# Patient Record
Sex: MALE | Race: WHITE | NOT HISPANIC OR LATINO | URBAN - METROPOLITAN AREA
[De-identification: names, ages, dates, MRNs, and addresses within clinical notes are randomized per-mention and may not be internally consistent; named-entity substitution may affect disease eponyms.]

---

## 2017-01-21 ENCOUNTER — EMERGENCY (EMERGENCY)
Facility: HOSPITAL | Age: 57
LOS: 0 days | Discharge: NURSING, REHAB OR LONG-TERM CARE | End: 2017-01-21
Admitting: EMERGENCY MEDICINE
Payer: MEDICARE

## 2017-01-21 DIAGNOSIS — M54.5 LOW BACK PAIN: ICD-10-CM

## 2017-01-21 DIAGNOSIS — Z79.52 LONG TERM (CURRENT) USE OF SYSTEMIC STEROIDS: ICD-10-CM

## 2017-01-21 DIAGNOSIS — Z91.81 HISTORY OF FALLING: ICD-10-CM

## 2017-01-21 PROCEDURE — 72125 CT NECK SPINE W/O DYE: CPT | Mod: 26

## 2017-01-21 PROCEDURE — 72128 CT CHEST SPINE W/O DYE: CPT | Mod: 26

## 2017-01-21 PROCEDURE — 73501 X-RAY EXAM HIP UNI 1 VIEW: CPT | Mod: 26

## 2017-01-21 PROCEDURE — 72131 CT LUMBAR SPINE W/O DYE: CPT | Mod: 26

## 2017-01-21 PROCEDURE — 71010: CPT | Mod: 26

## 2017-01-21 PROCEDURE — 70450 CT HEAD/BRAIN W/O DYE: CPT | Mod: 26

## 2017-01-21 PROCEDURE — 99284 EMERGENCY DEPT VISIT MOD MDM: CPT | Mod: 25

## 2017-03-16 PROBLEM — Z00.00 ENCOUNTER FOR PREVENTIVE HEALTH EXAMINATION: Status: ACTIVE | Noted: 2017-03-16

## 2017-03-21 ENCOUNTER — INPATIENT (INPATIENT)
Facility: HOSPITAL | Age: 57
LOS: 13 days | Discharge: HOME | End: 2017-04-04
Attending: PSYCHIATRY & NEUROLOGY | Admitting: PSYCHIATRY & NEUROLOGY
Payer: MEDICARE

## 2017-03-21 ENCOUNTER — APPOINTMENT (OUTPATIENT)
Dept: PHYSICAL MEDICINE AND REHAB | Facility: CLINIC | Age: 57
End: 2017-03-21

## 2017-03-21 ENCOUNTER — EMERGENCY (EMERGENCY)
Facility: HOSPITAL | Age: 57
LOS: 1 days | Discharge: HOME | End: 2017-03-21
Admitting: EMERGENCY MEDICINE
Payer: MEDICARE

## 2017-03-21 VITALS
DIASTOLIC BLOOD PRESSURE: 86 MMHG | HEART RATE: 75 BPM | SYSTOLIC BLOOD PRESSURE: 133 MMHG | HEIGHT: 69 IN | RESPIRATION RATE: 16 BRPM | WEIGHT: 165 LBS | OXYGEN SATURATION: 97 % | BODY MASS INDEX: 24.44 KG/M2

## 2017-03-21 DIAGNOSIS — Z80.8 FAMILY HISTORY OF MALIGNANT NEOPLASM OF OTHER ORGANS OR SYSTEMS: ICD-10-CM

## 2017-03-21 DIAGNOSIS — R45.851 SUICIDAL IDEATIONS: ICD-10-CM

## 2017-03-21 DIAGNOSIS — F32.9 MAJOR DEPRESSIVE DISORDER, SINGLE EPISODE, UNSPECIFIED: ICD-10-CM

## 2017-03-21 DIAGNOSIS — Z87.891 PERSONAL HISTORY OF NICOTINE DEPENDENCE: ICD-10-CM

## 2017-03-21 DIAGNOSIS — Z79.899 OTHER LONG TERM (CURRENT) DRUG THERAPY: ICD-10-CM

## 2017-03-21 DIAGNOSIS — Z88.0 ALLERGY STATUS TO PENICILLIN: ICD-10-CM

## 2017-03-21 DIAGNOSIS — Z78.9 OTHER SPECIFIED HEALTH STATUS: ICD-10-CM

## 2017-03-21 DIAGNOSIS — Z87.39 PERSONAL HISTORY OF OTHER DISEASES OF THE MUSCULOSKELETAL SYSTEM AND CONNECTIVE TISSUE: ICD-10-CM

## 2017-03-21 DIAGNOSIS — Z86.69 PERSONAL HISTORY OF OTHER DISEASES OF THE NERVOUS SYSTEM AND SENSE ORGANS: ICD-10-CM

## 2017-03-21 PROCEDURE — 96372 THER/PROPH/DIAG INJ SC/IM: CPT | Mod: XU

## 2017-03-21 PROCEDURE — 93010 ELECTROCARDIOGRAM REPORT: CPT

## 2017-03-21 PROCEDURE — 85610 PROTHROMBIN TIME: CPT

## 2017-03-21 PROCEDURE — 85730 THROMBOPLASTIN TIME PARTIAL: CPT

## 2017-03-21 PROCEDURE — 84443 ASSAY THYROID STIM HORMONE: CPT

## 2017-03-21 PROCEDURE — 96360 HYDRATION IV INFUSION INIT: CPT

## 2017-03-21 PROCEDURE — 80053 COMPREHEN METABOLIC PANEL: CPT

## 2017-03-21 PROCEDURE — 81003 URINALYSIS AUTO W/O SCOPE: CPT

## 2017-03-21 PROCEDURE — 80307 DRUG TEST PRSMV CHEM ANLYZR: CPT

## 2017-03-21 PROCEDURE — 99285 EMERGENCY DEPT VISIT HI MDM: CPT

## 2017-03-21 PROCEDURE — 85027 COMPLETE CBC AUTOMATED: CPT

## 2017-03-21 PROCEDURE — 93005 ELECTROCARDIOGRAM TRACING: CPT

## 2017-03-21 PROCEDURE — 99285 EMERGENCY DEPT VISIT HI MDM: CPT | Mod: 25

## 2017-03-21 RX ORDER — GABAPENTIN 400 MG/1
300 CAPSULE ORAL DAILY
Qty: 0 | Refills: 0 | Status: DISCONTINUED | OUTPATIENT
Start: 2017-03-21 | End: 2017-04-04

## 2017-03-21 RX ORDER — HALOPERIDOL DECANOATE 100 MG/ML
5 INJECTION INTRAMUSCULAR EVERY 6 HOURS
Qty: 0 | Refills: 0 | Status: DISCONTINUED | OUTPATIENT
Start: 2017-03-21 | End: 2017-04-04

## 2017-03-21 RX ORDER — TAMSULOSIN HYDROCHLORIDE 0.4 MG/1
0.4 CAPSULE ORAL
Refills: 0 | Status: ACTIVE | COMMUNITY

## 2017-03-21 RX ORDER — LURASIDONE HYDROCHLORIDE 40 MG/1
20 TABLET ORAL
Qty: 0 | Refills: 0 | Status: DISCONTINUED | OUTPATIENT
Start: 2017-03-21 | End: 2017-03-23

## 2017-03-21 RX ORDER — TRAMADOL HYDROCHLORIDE 50 MG/1
50 TABLET ORAL DAILY
Qty: 0 | Refills: 0 | Status: DISCONTINUED | OUTPATIENT
Start: 2017-03-21 | End: 2017-03-28

## 2017-03-21 RX ORDER — TAMSULOSIN HYDROCHLORIDE 0.4 MG/1
0.4 CAPSULE ORAL AT BEDTIME
Qty: 0 | Refills: 0 | Status: DISCONTINUED | OUTPATIENT
Start: 2017-03-21 | End: 2017-04-04

## 2017-03-21 RX ORDER — BETHANECHOL CHLORIDE 25 MG/1
25 TABLET ORAL
Refills: 0 | Status: ACTIVE | COMMUNITY

## 2017-03-21 RX ORDER — FOLIC ACID 0.8 MG
1 TABLET ORAL DAILY
Qty: 0 | Refills: 0 | Status: DISCONTINUED | OUTPATIENT
Start: 2017-03-21 | End: 2017-04-04

## 2017-03-21 RX ORDER — THIAMINE MONONITRATE (VIT B1) 100 MG
100 TABLET ORAL DAILY
Qty: 0 | Refills: 0 | Status: DISCONTINUED | OUTPATIENT
Start: 2017-03-21 | End: 2017-04-04

## 2017-03-21 RX ORDER — BETHANECHOL CHLORIDE 25 MG
25 TABLET ORAL DAILY
Qty: 0 | Refills: 0 | Status: DISCONTINUED | OUTPATIENT
Start: 2017-03-21 | End: 2017-04-04

## 2017-03-21 RX ORDER — HYDROXYZINE HCL 10 MG
50 TABLET ORAL EVERY 6 HOURS
Qty: 0 | Refills: 0 | Status: DISCONTINUED | OUTPATIENT
Start: 2017-03-21 | End: 2017-04-04

## 2017-03-21 RX ORDER — GABAPENTIN 300 MG/1
300 CAPSULE ORAL
Refills: 0 | Status: ACTIVE | COMMUNITY

## 2017-03-21 RX ORDER — TRAMADOL HYDROCHLORIDE 50 MG/1
50 TABLET, COATED ORAL
Refills: 0 | Status: COMPLETED | COMMUNITY

## 2017-03-21 RX ADMIN — BACLOFEN 0.5 MG: 10 TABLET ORAL at 00:00

## 2017-03-22 VITALS — TEMPERATURE: 98 F

## 2017-03-22 LAB
ALBUMIN SERPL ELPH-MCNC: 4 G/DL — SIGNIFICANT CHANGE UP (ref 3.3–5)
ALP SERPL-CCNC: 48 U/L — SIGNIFICANT CHANGE UP (ref 40–120)
ALT FLD-CCNC: 9 U/L — SIGNIFICANT CHANGE UP (ref 4–41)
AST SERPL-CCNC: 23 U/L — SIGNIFICANT CHANGE UP (ref 4–40)
BASOPHILS # BLD AUTO: 0.02 K/UL — SIGNIFICANT CHANGE UP (ref 0–0.2)
BASOPHILS NFR BLD AUTO: 0.5 % — SIGNIFICANT CHANGE UP (ref 0–2)
BILIRUB SERPL-MCNC: 0.4 MG/DL — SIGNIFICANT CHANGE UP (ref 0.2–1.2)
BUN SERPL-MCNC: 12 MG/DL — SIGNIFICANT CHANGE UP (ref 7–23)
CALCIUM SERPL-MCNC: 9.7 MG/DL — SIGNIFICANT CHANGE UP (ref 8.4–10.5)
CHLORIDE SERPL-SCNC: 106 MMOL/L — SIGNIFICANT CHANGE UP (ref 98–107)
CHOLEST SERPL-MCNC: 134 MG/DL — SIGNIFICANT CHANGE UP (ref 120–199)
CO2 SERPL-SCNC: 21 MMOL/L — LOW (ref 22–31)
CREAT SERPL-MCNC: 0.86 MG/DL — SIGNIFICANT CHANGE UP (ref 0.5–1.3)
EOSINOPHIL # BLD AUTO: 0.29 K/UL — SIGNIFICANT CHANGE UP (ref 0–0.5)
EOSINOPHIL NFR BLD AUTO: 7.1 % — HIGH (ref 0–6)
GLUCOSE SERPL-MCNC: 74 MG/DL — SIGNIFICANT CHANGE UP (ref 70–99)
HCT VFR BLD CALC: 41.3 % — SIGNIFICANT CHANGE UP (ref 39–50)
HDLC SERPL-MCNC: 51 MG/DL — SIGNIFICANT CHANGE UP (ref 35–55)
HGB BLD-MCNC: 13.5 G/DL — SIGNIFICANT CHANGE UP (ref 13–17)
IMM GRANULOCYTES NFR BLD AUTO: 0.2 % — SIGNIFICANT CHANGE UP (ref 0–1.5)
LIPID PNL WITH DIRECT LDL SERPL: 75 MG/DL — SIGNIFICANT CHANGE UP
LYMPHOCYTES # BLD AUTO: 1.49 K/UL — SIGNIFICANT CHANGE UP (ref 1–3.3)
LYMPHOCYTES # BLD AUTO: 36.6 % — SIGNIFICANT CHANGE UP (ref 13–44)
MCHC RBC-ENTMCNC: 29 PG — SIGNIFICANT CHANGE UP (ref 27–34)
MCHC RBC-ENTMCNC: 32.7 % — SIGNIFICANT CHANGE UP (ref 32–36)
MCV RBC AUTO: 88.8 FL — SIGNIFICANT CHANGE UP (ref 80–100)
MONOCYTES # BLD AUTO: 0.35 K/UL — SIGNIFICANT CHANGE UP (ref 0–0.9)
MONOCYTES NFR BLD AUTO: 8.6 % — SIGNIFICANT CHANGE UP (ref 2–14)
NEUTROPHILS # BLD AUTO: 1.91 K/UL — SIGNIFICANT CHANGE UP (ref 1.8–7.4)
NEUTROPHILS NFR BLD AUTO: 47 % — SIGNIFICANT CHANGE UP (ref 43–77)
PLATELET # BLD AUTO: 291 K/UL — SIGNIFICANT CHANGE UP (ref 150–400)
PMV BLD: 10.9 FL — SIGNIFICANT CHANGE UP (ref 7–13)
POTASSIUM SERPL-MCNC: 4.4 MMOL/L — SIGNIFICANT CHANGE UP (ref 3.5–5.3)
POTASSIUM SERPL-SCNC: 4.4 MMOL/L — SIGNIFICANT CHANGE UP (ref 3.5–5.3)
PROT SERPL-MCNC: 6.4 G/DL — SIGNIFICANT CHANGE UP (ref 6–8.3)
RBC # BLD: 4.65 M/UL — SIGNIFICANT CHANGE UP (ref 4.2–5.8)
RBC # FLD: 14.3 % — SIGNIFICANT CHANGE UP (ref 10.3–14.5)
SODIUM SERPL-SCNC: 146 MMOL/L — HIGH (ref 135–145)
TRIGL SERPL-MCNC: 74 MG/DL — SIGNIFICANT CHANGE UP (ref 10–149)
TSH SERPL-MCNC: 2.16 UIU/ML — SIGNIFICANT CHANGE UP (ref 0.27–4.2)
WBC # BLD: 4.07 K/UL — SIGNIFICANT CHANGE UP (ref 3.8–10.5)
WBC # FLD AUTO: 4.07 K/UL — SIGNIFICANT CHANGE UP (ref 3.8–10.5)

## 2017-03-22 PROCEDURE — 93010 ELECTROCARDIOGRAM REPORT: CPT

## 2017-03-22 PROCEDURE — 99233 SBSQ HOSP IP/OBS HIGH 50: CPT

## 2017-03-22 RX ADMIN — Medication 100 MILLIGRAM(S): at 10:15

## 2017-03-22 RX ADMIN — Medication 1 MILLIGRAM(S): at 10:15

## 2017-03-22 RX ADMIN — TRAMADOL HYDROCHLORIDE 50 MILLIGRAM(S): 50 TABLET ORAL at 10:15

## 2017-03-22 RX ADMIN — GABAPENTIN 300 MILLIGRAM(S): 400 CAPSULE ORAL at 10:15

## 2017-03-22 RX ADMIN — TRAMADOL HYDROCHLORIDE 50 MILLIGRAM(S): 50 TABLET ORAL at 11:15

## 2017-03-22 RX ADMIN — Medication 25 MILLIGRAM(S): at 10:15

## 2017-03-22 RX ADMIN — LURASIDONE HYDROCHLORIDE 20 MILLIGRAM(S): 40 TABLET ORAL at 17:12

## 2017-03-22 RX ADMIN — TAMSULOSIN HYDROCHLORIDE 0.4 MILLIGRAM(S): 0.4 CAPSULE ORAL at 21:26

## 2017-03-22 RX ADMIN — Medication 1 TABLET(S): at 10:15

## 2017-03-23 LAB — HBA1C BLD-MCNC: 5.3 % — SIGNIFICANT CHANGE UP (ref 4–5.6)

## 2017-03-23 PROCEDURE — 99232 SBSQ HOSP IP/OBS MODERATE 35: CPT

## 2017-03-23 RX ORDER — BACLOFEN 10 MG/1
10 TABLET ORAL 3 TIMES DAILY
Qty: 30 | Refills: 0 | Status: ACTIVE | COMMUNITY
Start: 2017-03-23 | End: 1900-01-01

## 2017-03-23 RX ORDER — LURASIDONE HYDROCHLORIDE 40 MG/1
40 TABLET ORAL DAILY
Qty: 0 | Refills: 0 | Status: DISCONTINUED | OUTPATIENT
Start: 2017-03-23 | End: 2017-03-24

## 2017-03-23 RX ADMIN — Medication 1 MILLIGRAM(S): at 09:13

## 2017-03-23 RX ADMIN — Medication 100 MILLIGRAM(S): at 09:13

## 2017-03-23 RX ADMIN — TRAMADOL HYDROCHLORIDE 50 MILLIGRAM(S): 50 TABLET ORAL at 09:13

## 2017-03-23 RX ADMIN — TAMSULOSIN HYDROCHLORIDE 0.4 MILLIGRAM(S): 0.4 CAPSULE ORAL at 21:12

## 2017-03-23 RX ADMIN — GABAPENTIN 300 MILLIGRAM(S): 400 CAPSULE ORAL at 09:13

## 2017-03-23 RX ADMIN — Medication 25 MILLIGRAM(S): at 09:13

## 2017-03-23 RX ADMIN — Medication 1 TABLET(S): at 09:13

## 2017-03-24 PROCEDURE — 99232 SBSQ HOSP IP/OBS MODERATE 35: CPT

## 2017-03-24 RX ORDER — LURASIDONE HYDROCHLORIDE 40 MG/1
60 TABLET ORAL DAILY
Qty: 0 | Refills: 0 | Status: DISCONTINUED | OUTPATIENT
Start: 2017-03-24 | End: 2017-04-04

## 2017-03-24 RX ADMIN — Medication 100 MILLIGRAM(S): at 09:13

## 2017-03-24 RX ADMIN — Medication 1 TABLET(S): at 09:13

## 2017-03-24 RX ADMIN — Medication 1 MILLIGRAM(S): at 09:13

## 2017-03-24 RX ADMIN — Medication 25 MILLIGRAM(S): at 09:13

## 2017-03-24 RX ADMIN — GABAPENTIN 300 MILLIGRAM(S): 400 CAPSULE ORAL at 09:13

## 2017-03-24 RX ADMIN — TAMSULOSIN HYDROCHLORIDE 0.4 MILLIGRAM(S): 0.4 CAPSULE ORAL at 21:23

## 2017-03-24 RX ADMIN — TRAMADOL HYDROCHLORIDE 50 MILLIGRAM(S): 50 TABLET ORAL at 09:13

## 2017-03-24 RX ADMIN — TRAMADOL HYDROCHLORIDE 50 MILLIGRAM(S): 50 TABLET ORAL at 10:15

## 2017-03-24 RX ADMIN — LURASIDONE HYDROCHLORIDE 40 MILLIGRAM(S): 40 TABLET ORAL at 09:13

## 2017-03-25 PROCEDURE — 99231 SBSQ HOSP IP/OBS SF/LOW 25: CPT

## 2017-03-25 RX ADMIN — GABAPENTIN 300 MILLIGRAM(S): 400 CAPSULE ORAL at 09:22

## 2017-03-25 RX ADMIN — Medication 1 MILLIGRAM(S): at 09:22

## 2017-03-25 RX ADMIN — TRAMADOL HYDROCHLORIDE 50 MILLIGRAM(S): 50 TABLET ORAL at 09:24

## 2017-03-25 RX ADMIN — LURASIDONE HYDROCHLORIDE 60 MILLIGRAM(S): 40 TABLET ORAL at 09:23

## 2017-03-25 RX ADMIN — TRAMADOL HYDROCHLORIDE 50 MILLIGRAM(S): 50 TABLET ORAL at 10:15

## 2017-03-25 RX ADMIN — Medication 25 MILLIGRAM(S): at 09:22

## 2017-03-25 RX ADMIN — TAMSULOSIN HYDROCHLORIDE 0.4 MILLIGRAM(S): 0.4 CAPSULE ORAL at 20:31

## 2017-03-25 RX ADMIN — Medication 100 MILLIGRAM(S): at 09:23

## 2017-03-25 RX ADMIN — Medication 1 TABLET(S): at 09:23

## 2017-03-26 RX ADMIN — TRAMADOL HYDROCHLORIDE 50 MILLIGRAM(S): 50 TABLET ORAL at 09:23

## 2017-03-26 RX ADMIN — Medication 1 TABLET(S): at 09:23

## 2017-03-26 RX ADMIN — LURASIDONE HYDROCHLORIDE 60 MILLIGRAM(S): 40 TABLET ORAL at 09:23

## 2017-03-26 RX ADMIN — TAMSULOSIN HYDROCHLORIDE 0.4 MILLIGRAM(S): 0.4 CAPSULE ORAL at 21:54

## 2017-03-26 RX ADMIN — Medication 100 MILLIGRAM(S): at 09:23

## 2017-03-26 RX ADMIN — GABAPENTIN 300 MILLIGRAM(S): 400 CAPSULE ORAL at 09:23

## 2017-03-26 RX ADMIN — Medication 1 MILLIGRAM(S): at 09:23

## 2017-03-26 RX ADMIN — Medication 25 MILLIGRAM(S): at 09:22

## 2017-03-27 PROCEDURE — 99231 SBSQ HOSP IP/OBS SF/LOW 25: CPT

## 2017-03-27 RX ADMIN — Medication 25 MILLIGRAM(S): at 09:01

## 2017-03-27 RX ADMIN — TRAMADOL HYDROCHLORIDE 50 MILLIGRAM(S): 50 TABLET ORAL at 09:01

## 2017-03-27 RX ADMIN — Medication 100 MILLIGRAM(S): at 09:01

## 2017-03-27 RX ADMIN — TAMSULOSIN HYDROCHLORIDE 0.4 MILLIGRAM(S): 0.4 CAPSULE ORAL at 21:08

## 2017-03-27 RX ADMIN — Medication 1 MILLIGRAM(S): at 09:01

## 2017-03-27 RX ADMIN — GABAPENTIN 300 MILLIGRAM(S): 400 CAPSULE ORAL at 09:01

## 2017-03-27 RX ADMIN — Medication 1 TABLET(S): at 09:01

## 2017-03-27 RX ADMIN — LURASIDONE HYDROCHLORIDE 60 MILLIGRAM(S): 40 TABLET ORAL at 09:01

## 2017-03-28 PROCEDURE — 99232 SBSQ HOSP IP/OBS MODERATE 35: CPT

## 2017-03-28 RX ADMIN — TAMSULOSIN HYDROCHLORIDE 0.4 MILLIGRAM(S): 0.4 CAPSULE ORAL at 20:58

## 2017-03-28 RX ADMIN — Medication 1 TABLET(S): at 08:54

## 2017-03-28 RX ADMIN — Medication 25 MILLIGRAM(S): at 08:54

## 2017-03-28 RX ADMIN — TRAMADOL HYDROCHLORIDE 50 MILLIGRAM(S): 50 TABLET ORAL at 08:54

## 2017-03-28 RX ADMIN — LURASIDONE HYDROCHLORIDE 60 MILLIGRAM(S): 40 TABLET ORAL at 08:54

## 2017-03-28 RX ADMIN — GABAPENTIN 300 MILLIGRAM(S): 400 CAPSULE ORAL at 08:54

## 2017-03-28 RX ADMIN — Medication 100 MILLIGRAM(S): at 08:54

## 2017-03-28 RX ADMIN — Medication 1 MILLIGRAM(S): at 08:54

## 2017-03-28 RX ADMIN — TRAMADOL HYDROCHLORIDE 50 MILLIGRAM(S): 50 TABLET ORAL at 10:00

## 2017-03-29 PROCEDURE — 99232 SBSQ HOSP IP/OBS MODERATE 35: CPT

## 2017-03-29 RX ADMIN — Medication 100 MILLIGRAM(S): at 10:11

## 2017-03-29 RX ADMIN — Medication 1 TABLET(S): at 10:11

## 2017-03-29 RX ADMIN — Medication 1 MILLIGRAM(S): at 10:11

## 2017-03-29 RX ADMIN — Medication 25 MILLIGRAM(S): at 10:11

## 2017-03-29 RX ADMIN — LURASIDONE HYDROCHLORIDE 60 MILLIGRAM(S): 40 TABLET ORAL at 10:11

## 2017-03-29 RX ADMIN — TAMSULOSIN HYDROCHLORIDE 0.4 MILLIGRAM(S): 0.4 CAPSULE ORAL at 22:11

## 2017-03-29 RX ADMIN — GABAPENTIN 300 MILLIGRAM(S): 400 CAPSULE ORAL at 10:11

## 2017-03-30 PROCEDURE — 99232 SBSQ HOSP IP/OBS MODERATE 35: CPT

## 2017-03-30 RX ADMIN — GABAPENTIN 300 MILLIGRAM(S): 400 CAPSULE ORAL at 08:55

## 2017-03-30 RX ADMIN — Medication 100 MILLIGRAM(S): at 08:55

## 2017-03-30 RX ADMIN — Medication 1 MILLIGRAM(S): at 08:55

## 2017-03-30 RX ADMIN — Medication 1 TABLET(S): at 08:55

## 2017-03-30 RX ADMIN — LURASIDONE HYDROCHLORIDE 60 MILLIGRAM(S): 40 TABLET ORAL at 08:55

## 2017-03-30 RX ADMIN — Medication 25 MILLIGRAM(S): at 08:55

## 2017-03-30 RX ADMIN — TAMSULOSIN HYDROCHLORIDE 0.4 MILLIGRAM(S): 0.4 CAPSULE ORAL at 21:00

## 2017-03-31 PROCEDURE — 99232 SBSQ HOSP IP/OBS MODERATE 35: CPT

## 2017-03-31 RX ADMIN — LURASIDONE HYDROCHLORIDE 60 MILLIGRAM(S): 40 TABLET ORAL at 08:59

## 2017-03-31 RX ADMIN — Medication 1 MILLIGRAM(S): at 08:59

## 2017-03-31 RX ADMIN — GABAPENTIN 300 MILLIGRAM(S): 400 CAPSULE ORAL at 08:59

## 2017-03-31 RX ADMIN — Medication 100 MILLIGRAM(S): at 08:59

## 2017-03-31 RX ADMIN — Medication 1 TABLET(S): at 08:59

## 2017-03-31 RX ADMIN — Medication 25 MILLIGRAM(S): at 08:59

## 2017-03-31 RX ADMIN — TAMSULOSIN HYDROCHLORIDE 0.4 MILLIGRAM(S): 0.4 CAPSULE ORAL at 21:54

## 2017-04-01 RX ADMIN — LURASIDONE HYDROCHLORIDE 60 MILLIGRAM(S): 40 TABLET ORAL at 09:23

## 2017-04-01 RX ADMIN — Medication 25 MILLIGRAM(S): at 09:21

## 2017-04-01 RX ADMIN — GABAPENTIN 300 MILLIGRAM(S): 400 CAPSULE ORAL at 09:23

## 2017-04-01 RX ADMIN — Medication 100 MILLIGRAM(S): at 09:23

## 2017-04-01 RX ADMIN — Medication 1 TABLET(S): at 09:23

## 2017-04-01 RX ADMIN — TAMSULOSIN HYDROCHLORIDE 0.4 MILLIGRAM(S): 0.4 CAPSULE ORAL at 20:39

## 2017-04-01 RX ADMIN — Medication 1 MILLIGRAM(S): at 09:21

## 2017-04-02 RX ADMIN — LURASIDONE HYDROCHLORIDE 60 MILLIGRAM(S): 40 TABLET ORAL at 08:44

## 2017-04-02 RX ADMIN — Medication 1 TABLET(S): at 08:45

## 2017-04-02 RX ADMIN — Medication 25 MILLIGRAM(S): at 08:44

## 2017-04-02 RX ADMIN — Medication 100 MILLIGRAM(S): at 08:45

## 2017-04-02 RX ADMIN — Medication 1 MILLIGRAM(S): at 08:44

## 2017-04-02 RX ADMIN — GABAPENTIN 300 MILLIGRAM(S): 400 CAPSULE ORAL at 08:44

## 2017-04-02 RX ADMIN — TAMSULOSIN HYDROCHLORIDE 0.4 MILLIGRAM(S): 0.4 CAPSULE ORAL at 20:35

## 2017-04-03 PROCEDURE — 99232 SBSQ HOSP IP/OBS MODERATE 35: CPT

## 2017-04-03 RX ADMIN — Medication 1 TABLET(S): at 08:41

## 2017-04-03 RX ADMIN — TAMSULOSIN HYDROCHLORIDE 0.4 MILLIGRAM(S): 0.4 CAPSULE ORAL at 21:44

## 2017-04-03 RX ADMIN — Medication 1 MILLIGRAM(S): at 08:41

## 2017-04-03 RX ADMIN — Medication 25 MILLIGRAM(S): at 08:41

## 2017-04-03 RX ADMIN — LURASIDONE HYDROCHLORIDE 60 MILLIGRAM(S): 40 TABLET ORAL at 08:41

## 2017-04-03 RX ADMIN — Medication 100 MILLIGRAM(S): at 08:41

## 2017-04-03 RX ADMIN — GABAPENTIN 300 MILLIGRAM(S): 400 CAPSULE ORAL at 08:41

## 2017-04-04 VITALS — WEIGHT: 165.35 LBS | HEIGHT: 70 IN

## 2017-04-04 PROCEDURE — 99238 HOSP IP/OBS DSCHRG MGMT 30/<: CPT

## 2017-04-04 RX ORDER — LURASIDONE HYDROCHLORIDE 40 MG/1
1 TABLET ORAL
Qty: 15 | Refills: 0 | OUTPATIENT
Start: 2017-04-04 | End: 2017-04-19

## 2017-04-04 RX ORDER — THIAMINE MONONITRATE (VIT B1) 100 MG
1 TABLET ORAL
Qty: 15 | Refills: 0 | OUTPATIENT
Start: 2017-04-04 | End: 2017-04-19

## 2017-04-04 RX ORDER — TAMSULOSIN HYDROCHLORIDE 0.4 MG/1
1 CAPSULE ORAL
Qty: 15 | Refills: 0 | OUTPATIENT
Start: 2017-04-04 | End: 2017-04-19

## 2017-04-04 RX ORDER — GABAPENTIN 400 MG/1
1 CAPSULE ORAL
Qty: 15 | Refills: 0 | OUTPATIENT
Start: 2017-04-04 | End: 2017-04-19

## 2017-04-04 RX ORDER — FOLIC ACID 0.8 MG
1 TABLET ORAL
Qty: 15 | Refills: 0 | OUTPATIENT
Start: 2017-04-04 | End: 2017-04-19

## 2017-04-04 RX ORDER — BETHANECHOL CHLORIDE 25 MG
1 TABLET ORAL
Qty: 15 | Refills: 0 | OUTPATIENT
Start: 2017-04-04 | End: 2017-04-19

## 2017-04-04 RX ADMIN — Medication 1 MILLIGRAM(S): at 08:49

## 2017-04-04 RX ADMIN — LURASIDONE HYDROCHLORIDE 60 MILLIGRAM(S): 40 TABLET ORAL at 08:49

## 2017-04-04 RX ADMIN — Medication 100 MILLIGRAM(S): at 08:49

## 2017-04-04 RX ADMIN — GABAPENTIN 300 MILLIGRAM(S): 400 CAPSULE ORAL at 08:49

## 2017-04-04 RX ADMIN — Medication 25 MILLIGRAM(S): at 08:49

## 2017-04-04 RX ADMIN — Medication 1 TABLET(S): at 08:49

## 2017-04-27 ENCOUNTER — OUTPATIENT (OUTPATIENT)
Dept: OUTPATIENT SERVICES | Facility: HOSPITAL | Age: 57
LOS: 1 days | Discharge: STILL ADMITTED OR ADMINISTRATIVE | End: 2017-04-27

## 2017-04-27 DIAGNOSIS — F33.3 MAJOR DEPRESSIVE DISORDER, RECURRENT, SEVERE WITH PSYCHOTIC SYMPTOMS: ICD-10-CM

## 2017-05-04 ENCOUNTER — APPOINTMENT (OUTPATIENT)
Dept: PHYSICAL MEDICINE AND REHAB | Facility: CLINIC | Age: 57
End: 2017-05-04

## 2017-05-04 VITALS
SYSTOLIC BLOOD PRESSURE: 117 MMHG | BODY MASS INDEX: 24.44 KG/M2 | RESPIRATION RATE: 16 BRPM | WEIGHT: 165 LBS | HEIGHT: 69 IN | DIASTOLIC BLOOD PRESSURE: 81 MMHG | OXYGEN SATURATION: 98 % | HEART RATE: 89 BPM

## 2017-05-04 DIAGNOSIS — R25.2 CRAMP AND SPASM: ICD-10-CM

## 2017-05-04 DIAGNOSIS — G95.20 UNSPECIFIED CORD COMPRESSION: ICD-10-CM

## 2017-05-04 RX ORDER — TIZANIDINE 4 MG/1
4 TABLET ORAL TWICE DAILY
Qty: 30 | Refills: 0 | Status: ACTIVE | COMMUNITY
Start: 2017-05-04 | End: 1900-01-01

## 2017-07-24 ENCOUNTER — APPOINTMENT (OUTPATIENT)
Dept: NEUROLOGY | Facility: CLINIC | Age: 57
End: 2017-07-24

## 2017-09-28 ENCOUNTER — APPOINTMENT (OUTPATIENT)
Dept: NEUROLOGY | Facility: CLINIC | Age: 57
End: 2017-09-28

## 2018-01-13 ENCOUNTER — INPATIENT (INPATIENT)
Facility: HOSPITAL | Age: 58
LOS: 4 days | Discharge: HOME HEALTH | End: 2018-01-18
Attending: INTERNAL MEDICINE | Admitting: INTERNAL MEDICINE
Payer: MEDICARE

## 2018-01-13 VITALS
DIASTOLIC BLOOD PRESSURE: 103 MMHG | SYSTOLIC BLOOD PRESSURE: 146 MMHG | HEART RATE: 85 BPM | RESPIRATION RATE: 15 BRPM | TEMPERATURE: 98 F | HEIGHT: 67 IN | WEIGHT: 190.04 LBS | OXYGEN SATURATION: 100 %

## 2018-01-13 PROCEDURE — 99285 EMERGENCY DEPT VISIT HI MDM: CPT

## 2018-01-13 RX ORDER — IBUPROFEN 200 MG
600 TABLET ORAL ONCE
Qty: 0 | Refills: 0 | Status: COMPLETED | OUTPATIENT
Start: 2018-01-13 | End: 2018-01-13

## 2018-01-13 RX ORDER — ACETAMINOPHEN 500 MG
650 TABLET ORAL ONCE
Qty: 0 | Refills: 0 | Status: COMPLETED | OUTPATIENT
Start: 2018-01-13 | End: 2018-01-13

## 2018-01-13 RX ADMIN — Medication 600 MILLIGRAM(S): at 22:47

## 2018-01-13 RX ADMIN — Medication 650 MILLIGRAM(S): at 15:43
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
## 2018-01-13 NOTE — ED PROVIDER NOTE - CARE PLAN
Principal Discharge DX:	Back pain Principal Discharge DX:	Weakness  Secondary Diagnosis:	Failure to thrive in adult

## 2018-01-13 NOTE — ED PROVIDER NOTE - MEDICAL DECISION MAKING DETAILS
56 yo male with left sided back pain; no trauma or injury; no red flags; pain control; re-eval closely.

## 2018-01-13 NOTE — ED PROVIDER NOTE - OBJECTIVE STATEMENT
Patient is a 56 yo male with constant lower back pain x 10 days; patient reports hx of chronic neck pain; patient reports that sometimes- his neck pain makes his back pain worse; no incontinence of bowel or bladder function; no weakness; no numbness or tingling; patient uses cane and walker at home; no new trauma or injury; patient has been taking tramadol for pain with mild relief.

## 2018-01-14 LAB
ALBUMIN SERPL ELPH-MCNC: 3.7 G/DL — SIGNIFICANT CHANGE UP (ref 3.3–5)
ALP SERPL-CCNC: 61 U/L — SIGNIFICANT CHANGE UP (ref 40–120)
ALT FLD-CCNC: 15 U/L — SIGNIFICANT CHANGE UP (ref 12–78)
ANION GAP SERPL CALC-SCNC: 8 MMOL/L — SIGNIFICANT CHANGE UP (ref 5–17)
AST SERPL-CCNC: 35 U/L — SIGNIFICANT CHANGE UP (ref 15–37)
BASOPHILS # BLD AUTO: 0.1 K/UL — SIGNIFICANT CHANGE UP (ref 0–0.2)
BASOPHILS NFR BLD AUTO: 1.1 % — SIGNIFICANT CHANGE UP (ref 0–2)
BILIRUB SERPL-MCNC: 0.6 MG/DL — SIGNIFICANT CHANGE UP (ref 0.2–1.2)
BUN SERPL-MCNC: 12 MG/DL — SIGNIFICANT CHANGE UP (ref 7–23)
CALCIUM SERPL-MCNC: 9.2 MG/DL — SIGNIFICANT CHANGE UP (ref 8.5–10.1)
CHLORIDE SERPL-SCNC: 109 MMOL/L — HIGH (ref 96–108)
CO2 SERPL-SCNC: 25 MMOL/L — SIGNIFICANT CHANGE UP (ref 22–31)
CREAT SERPL-MCNC: 1.01 MG/DL — SIGNIFICANT CHANGE UP (ref 0.5–1.3)
EOSINOPHIL # BLD AUTO: 0.3 K/UL — SIGNIFICANT CHANGE UP (ref 0–0.5)
EOSINOPHIL NFR BLD AUTO: 5.2 % — SIGNIFICANT CHANGE UP (ref 0–6)
GLUCOSE SERPL-MCNC: 80 MG/DL — SIGNIFICANT CHANGE UP (ref 70–99)
HCT VFR BLD CALC: 45.3 % — SIGNIFICANT CHANGE UP (ref 39–50)
HGB BLD-MCNC: 15 G/DL — SIGNIFICANT CHANGE UP (ref 13–17)
LYMPHOCYTES # BLD AUTO: 1.2 K/UL — SIGNIFICANT CHANGE UP (ref 1–3.3)
LYMPHOCYTES # BLD AUTO: 25.3 % — SIGNIFICANT CHANGE UP (ref 13–44)
MCHC RBC-ENTMCNC: 29.3 PG — SIGNIFICANT CHANGE UP (ref 27–34)
MCHC RBC-ENTMCNC: 33.3 GM/DL — SIGNIFICANT CHANGE UP (ref 32–36)
MCV RBC AUTO: 88.2 FL — SIGNIFICANT CHANGE UP (ref 80–100)
MONOCYTES # BLD AUTO: 0.4 K/UL — SIGNIFICANT CHANGE UP (ref 0–0.9)
MONOCYTES NFR BLD AUTO: 8.9 % — SIGNIFICANT CHANGE UP (ref 2–14)
NEUTROPHILS # BLD AUTO: 2.9 K/UL — SIGNIFICANT CHANGE UP (ref 1.8–7.4)
NEUTROPHILS NFR BLD AUTO: 59.5 % — SIGNIFICANT CHANGE UP (ref 43–77)
PLATELET # BLD AUTO: 252 K/UL — SIGNIFICANT CHANGE UP (ref 150–400)
POTASSIUM SERPL-MCNC: 3.8 MMOL/L — SIGNIFICANT CHANGE UP (ref 3.5–5.3)
POTASSIUM SERPL-SCNC: 3.8 MMOL/L — SIGNIFICANT CHANGE UP (ref 3.5–5.3)
PROT SERPL-MCNC: 7 GM/DL — SIGNIFICANT CHANGE UP (ref 6–8.3)
RBC # BLD: 5.13 M/UL — SIGNIFICANT CHANGE UP (ref 4.2–5.8)
RBC # FLD: 12.8 % — SIGNIFICANT CHANGE UP (ref 10.3–14.5)
SODIUM SERPL-SCNC: 142 MMOL/L — SIGNIFICANT CHANGE UP (ref 135–145)
WBC # BLD: 4.9 K/UL — SIGNIFICANT CHANGE UP (ref 3.8–10.5)
WBC # FLD AUTO: 4.9 K/UL — SIGNIFICANT CHANGE UP (ref 3.8–10.5)

## 2018-01-14 PROCEDURE — 93010 ELECTROCARDIOGRAM REPORT: CPT

## 2018-01-14 PROCEDURE — 71045 X-RAY EXAM CHEST 1 VIEW: CPT | Mod: 26

## 2018-01-14 RX ORDER — GABAPENTIN 400 MG/1
300 CAPSULE ORAL DAILY
Qty: 0 | Refills: 0 | Status: DISCONTINUED | OUTPATIENT
Start: 2018-01-14 | End: 2018-01-18

## 2018-01-14 RX ORDER — DOCUSATE SODIUM 100 MG
100 CAPSULE ORAL THREE TIMES A DAY
Qty: 0 | Refills: 0 | Status: DISCONTINUED | OUTPATIENT
Start: 2018-01-14 | End: 2018-01-18

## 2018-01-14 RX ORDER — LURASIDONE HYDROCHLORIDE 40 MG/1
60 TABLET ORAL DAILY
Qty: 0 | Refills: 0 | Status: DISCONTINUED | OUTPATIENT
Start: 2018-01-14 | End: 2018-01-18

## 2018-01-14 RX ORDER — SODIUM CHLORIDE 9 MG/ML
1000 INJECTION INTRAMUSCULAR; INTRAVENOUS; SUBCUTANEOUS ONCE
Qty: 0 | Refills: 0 | Status: COMPLETED | OUTPATIENT
Start: 2018-01-14 | End: 2018-01-14

## 2018-01-14 RX ORDER — FOLIC ACID 0.8 MG
1 TABLET ORAL DAILY
Qty: 0 | Refills: 0 | Status: DISCONTINUED | OUTPATIENT
Start: 2018-01-14 | End: 2018-01-18

## 2018-01-14 RX ORDER — HEPARIN SODIUM 5000 [USP'U]/ML
5000 INJECTION INTRAVENOUS; SUBCUTANEOUS EVERY 8 HOURS
Qty: 0 | Refills: 0 | Status: DISCONTINUED | OUTPATIENT
Start: 2018-01-14 | End: 2018-01-18

## 2018-01-14 RX ORDER — INFLUENZA VIRUS VACCINE 15; 15; 15; 15 UG/.5ML; UG/.5ML; UG/.5ML; UG/.5ML
0.5 SUSPENSION INTRAMUSCULAR ONCE
Qty: 0 | Refills: 0 | Status: COMPLETED | OUTPATIENT
Start: 2018-01-14 | End: 2018-01-16

## 2018-01-14 RX ORDER — TAMSULOSIN HYDROCHLORIDE 0.4 MG/1
0.4 CAPSULE ORAL AT BEDTIME
Qty: 0 | Refills: 0 | Status: DISCONTINUED | OUTPATIENT
Start: 2018-01-14 | End: 2018-01-18

## 2018-01-14 RX ORDER — BETHANECHOL CHLORIDE 25 MG
25 TABLET ORAL DAILY
Qty: 0 | Refills: 0 | Status: DISCONTINUED | OUTPATIENT
Start: 2018-01-14 | End: 2018-01-18

## 2018-01-14 RX ORDER — MORPHINE SULFATE 50 MG/1
2 CAPSULE, EXTENDED RELEASE ORAL EVERY 4 HOURS
Qty: 0 | Refills: 0 | Status: DISCONTINUED | OUTPATIENT
Start: 2018-01-14 | End: 2018-01-15

## 2018-01-14 RX ORDER — ACETAMINOPHEN 500 MG
650 TABLET ORAL ONCE
Qty: 0 | Refills: 0 | Status: COMPLETED | OUTPATIENT
Start: 2018-01-14 | End: 2018-01-14

## 2018-01-14 RX ORDER — SENNA PLUS 8.6 MG/1
2 TABLET ORAL AT BEDTIME
Qty: 0 | Refills: 0 | Status: DISCONTINUED | OUTPATIENT
Start: 2018-01-14 | End: 2018-01-18

## 2018-01-14 RX ORDER — THIAMINE MONONITRATE (VIT B1) 100 MG
100 TABLET ORAL DAILY
Qty: 0 | Refills: 0 | Status: DISCONTINUED | OUTPATIENT
Start: 2018-01-14 | End: 2018-01-18

## 2018-01-14 RX ADMIN — Medication 650 MILLIGRAM(S): at 09:10

## 2018-01-14 RX ADMIN — MORPHINE SULFATE 2 MILLIGRAM(S): 50 CAPSULE, EXTENDED RELEASE ORAL at 15:05

## 2018-01-14 RX ADMIN — TAMSULOSIN HYDROCHLORIDE 0.4 MILLIGRAM(S): 0.4 CAPSULE ORAL at 21:34

## 2018-01-14 RX ADMIN — Medication 1 MILLIGRAM(S): at 16:45

## 2018-01-14 RX ADMIN — HEPARIN SODIUM 5000 UNIT(S): 5000 INJECTION INTRAVENOUS; SUBCUTANEOUS at 21:34

## 2018-01-14 RX ADMIN — LURASIDONE HYDROCHLORIDE 60 MILLIGRAM(S): 40 TABLET ORAL at 17:57

## 2018-01-14 RX ADMIN — SODIUM CHLORIDE 1000 MILLILITER(S): 9 INJECTION INTRAMUSCULAR; INTRAVENOUS; SUBCUTANEOUS at 13:30

## 2018-01-14 RX ADMIN — Medication 1 TABLET(S): at 16:45

## 2018-01-14 RX ADMIN — Medication 100 MILLIGRAM(S): at 17:57

## 2018-01-14 RX ADMIN — Medication 100 MILLIGRAM(S): at 21:34

## 2018-01-14 RX ADMIN — GABAPENTIN 300 MILLIGRAM(S): 400 CAPSULE ORAL at 16:45

## 2018-01-14 NOTE — H&P ADULT - NSHPPHYSICALEXAM_GEN_ALL_CORE
T(C): 36.9 (01-14-18 @ 00:25), Max: 36.9 (01-14-18 @ 00:25)  HR: 68 (01-14-18 @ 11:00) (68 - 80)  BP: 133/86 (01-14-18 @ 11:00) (124/80 - 135/78)  RR: 17 (01-14-18 @ 11:00) (16 - 18)  SpO2: 100% (01-14-18 @ 11:00) (99% - 100%)  Wt(kg): --    Gen: AAOx3, NAD  HEENT: NCAT, EOMI  Neck: Supple  CV: nml S1S2, RRR  Lungs: CTABL  Abd: Soft, NT, ND, BS+  Ext: No edema  Neuro: Non focal

## 2018-01-14 NOTE — H&P ADULT - ASSESSMENT
58yo male a/w back pain    # Back Pain  - no focal neurological deficits, chronic in nature, reports difficulty to ambulate with walker  - pain control  - Gabapentin  - PT eval  - SW consult, patient deemed unsafe to be discharged, needs placement    # DVT ppx, HSQ    # Admit, stable

## 2018-01-14 NOTE — H&P ADULT - NSHPLABSRESULTS_GEN_ALL_CORE
15.0   4.9   )-----------( 252      ( 14 Jan 2018 13:30 )             45.3     14 Jan 2018 13:30    142    |  109    |  12     ----------------------------<  80     3.8     |  25     |  1.01     Ca    9.2        14 Jan 2018 13:30    TPro  7.0    /  Alb  3.7    /  TBili  0.6    /  DBili  x      /  AST  35     /  ALT  15     /  AlkPhos  61     14 Jan 2018 13:30      CAPILLARY BLOOD GLUCOSE        LIVER FUNCTIONS - ( 14 Jan 2018 13:30 )  Alb: 3.7 g/dL / Pro: 7.0 gm/dL / ALK PHOS: 61 U/L / ALT: 15 U/L / AST: 35 U/L / GGT: x
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
## 2018-01-14 NOTE — H&P ADULT - NSHPREVIEWOFSYSTEMS_GEN_ALL_CORE
(-)Fever, chills, cough, chest pain, sob, headache, dizziness, palpitations, abd pain, n/v/d, leg swelling  (+)back pain

## 2018-01-15 LAB
APPEARANCE UR: CLEAR — SIGNIFICANT CHANGE UP
BILIRUB UR-MCNC: NEGATIVE — SIGNIFICANT CHANGE UP
COLOR SPEC: YELLOW — SIGNIFICANT CHANGE UP
DIFF PNL FLD: NEGATIVE — SIGNIFICANT CHANGE UP
GLUCOSE UR QL: NEGATIVE MG/DL — SIGNIFICANT CHANGE UP
KETONES UR-MCNC: (no result)
LEUKOCYTE ESTERASE UR-ACNC: NEGATIVE — SIGNIFICANT CHANGE UP
NITRITE UR-MCNC: NEGATIVE — SIGNIFICANT CHANGE UP
PH UR: 8 — SIGNIFICANT CHANGE UP (ref 5–8)
PROT UR-MCNC: NEGATIVE MG/DL — SIGNIFICANT CHANGE UP
SP GR SPEC: 1.01 — SIGNIFICANT CHANGE UP (ref 1.01–1.02)
UROBILINOGEN FLD QL: NEGATIVE MG/DL — SIGNIFICANT CHANGE UP

## 2018-01-15 PROCEDURE — 72131 CT LUMBAR SPINE W/O DYE: CPT | Mod: 26

## 2018-01-15 RX ORDER — HYDROMORPHONE HYDROCHLORIDE 2 MG/ML
1 INJECTION INTRAMUSCULAR; INTRAVENOUS; SUBCUTANEOUS
Qty: 0 | Refills: 0 | Status: DISCONTINUED | OUTPATIENT
Start: 2018-01-15 | End: 2018-01-18

## 2018-01-15 RX ORDER — OXYCODONE HYDROCHLORIDE 5 MG/1
5 TABLET ORAL EVERY 6 HOURS
Qty: 0 | Refills: 0 | Status: DISCONTINUED | OUTPATIENT
Start: 2018-01-15 | End: 2018-01-18

## 2018-01-15 RX ORDER — OXYCODONE HYDROCHLORIDE 5 MG/1
10 TABLET ORAL EVERY 6 HOURS
Qty: 0 | Refills: 0 | Status: DISCONTINUED | OUTPATIENT
Start: 2018-01-15 | End: 2018-01-18

## 2018-01-15 RX ADMIN — Medication 100 MILLIGRAM(S): at 13:08

## 2018-01-15 RX ADMIN — OXYCODONE HYDROCHLORIDE 10 MILLIGRAM(S): 5 TABLET ORAL at 23:16

## 2018-01-15 RX ADMIN — OXYCODONE HYDROCHLORIDE 10 MILLIGRAM(S): 5 TABLET ORAL at 10:17

## 2018-01-15 RX ADMIN — MORPHINE SULFATE 2 MILLIGRAM(S): 50 CAPSULE, EXTENDED RELEASE ORAL at 10:01

## 2018-01-15 RX ADMIN — Medication 1 TABLET(S): at 11:24

## 2018-01-15 RX ADMIN — Medication 100 MILLIGRAM(S): at 21:42

## 2018-01-15 RX ADMIN — LURASIDONE HYDROCHLORIDE 60 MILLIGRAM(S): 40 TABLET ORAL at 11:24

## 2018-01-15 RX ADMIN — TAMSULOSIN HYDROCHLORIDE 0.4 MILLIGRAM(S): 0.4 CAPSULE ORAL at 21:42

## 2018-01-15 RX ADMIN — Medication 100 MILLIGRAM(S): at 06:19

## 2018-01-15 RX ADMIN — Medication 25 MILLIGRAM(S): at 11:25

## 2018-01-15 RX ADMIN — HYDROMORPHONE HYDROCHLORIDE 1 MILLIGRAM(S): 2 INJECTION INTRAMUSCULAR; INTRAVENOUS; SUBCUTANEOUS at 18:15

## 2018-01-15 RX ADMIN — HEPARIN SODIUM 5000 UNIT(S): 5000 INJECTION INTRAVENOUS; SUBCUTANEOUS at 13:08

## 2018-01-15 RX ADMIN — MORPHINE SULFATE 2 MILLIGRAM(S): 50 CAPSULE, EXTENDED RELEASE ORAL at 09:01

## 2018-01-15 RX ADMIN — Medication 100 MILLIGRAM(S): at 11:25

## 2018-01-15 RX ADMIN — HYDROMORPHONE HYDROCHLORIDE 1 MILLIGRAM(S): 2 INJECTION INTRAMUSCULAR; INTRAVENOUS; SUBCUTANEOUS at 18:04

## 2018-01-15 RX ADMIN — HYDROMORPHONE HYDROCHLORIDE 1 MILLIGRAM(S): 2 INJECTION INTRAMUSCULAR; INTRAVENOUS; SUBCUTANEOUS at 21:44

## 2018-01-15 RX ADMIN — Medication 1 MILLIGRAM(S): at 11:25

## 2018-01-15 RX ADMIN — OXYCODONE HYDROCHLORIDE 10 MILLIGRAM(S): 5 TABLET ORAL at 11:07

## 2018-01-15 RX ADMIN — GABAPENTIN 300 MILLIGRAM(S): 400 CAPSULE ORAL at 11:24

## 2018-01-15 RX ADMIN — HEPARIN SODIUM 5000 UNIT(S): 5000 INJECTION INTRAVENOUS; SUBCUTANEOUS at 21:43

## 2018-01-15 RX ADMIN — HEPARIN SODIUM 5000 UNIT(S): 5000 INJECTION INTRAVENOUS; SUBCUTANEOUS at 06:19

## 2018-01-15 RX ADMIN — HYDROMORPHONE HYDROCHLORIDE 1 MILLIGRAM(S): 2 INJECTION INTRAMUSCULAR; INTRAVENOUS; SUBCUTANEOUS at 12:20

## 2018-01-15 RX ADMIN — HYDROMORPHONE HYDROCHLORIDE 1 MILLIGRAM(S): 2 INJECTION INTRAMUSCULAR; INTRAVENOUS; SUBCUTANEOUS at 22:00

## 2018-01-15 RX ADMIN — HYDROMORPHONE HYDROCHLORIDE 1 MILLIGRAM(S): 2 INJECTION INTRAMUSCULAR; INTRAVENOUS; SUBCUTANEOUS at 12:06

## 2018-01-15 NOTE — PHYSICAL THERAPY INITIAL EVALUATION ADULT - ACTIVE RANGE OF MOTION EXAMINATION, REHAB EVAL
Bilateral hip flexion ~ 90 degrees limited by pain. Right DF neutral. Noted left foot/ankle calcaneal valgus/pronation, ROM limited./no Active ROM deficits were identified
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
## 2018-01-16 DIAGNOSIS — M54.9 DORSALGIA, UNSPECIFIED: ICD-10-CM

## 2018-01-16 RX ADMIN — Medication 1 MILLIGRAM(S): at 11:16

## 2018-01-16 RX ADMIN — HYDROMORPHONE HYDROCHLORIDE 1 MILLIGRAM(S): 2 INJECTION INTRAMUSCULAR; INTRAVENOUS; SUBCUTANEOUS at 02:31

## 2018-01-16 RX ADMIN — HEPARIN SODIUM 5000 UNIT(S): 5000 INJECTION INTRAVENOUS; SUBCUTANEOUS at 21:46

## 2018-01-16 RX ADMIN — HYDROMORPHONE HYDROCHLORIDE 1 MILLIGRAM(S): 2 INJECTION INTRAMUSCULAR; INTRAVENOUS; SUBCUTANEOUS at 02:45

## 2018-01-16 RX ADMIN — LURASIDONE HYDROCHLORIDE 60 MILLIGRAM(S): 40 TABLET ORAL at 11:16

## 2018-01-16 RX ADMIN — TAMSULOSIN HYDROCHLORIDE 0.4 MILLIGRAM(S): 0.4 CAPSULE ORAL at 21:46

## 2018-01-16 RX ADMIN — Medication 100 MILLIGRAM(S): at 15:01

## 2018-01-16 RX ADMIN — Medication 1 TABLET(S): at 11:16

## 2018-01-16 RX ADMIN — Medication 60 MILLIGRAM(S): at 15:01

## 2018-01-16 RX ADMIN — HYDROMORPHONE HYDROCHLORIDE 1 MILLIGRAM(S): 2 INJECTION INTRAMUSCULAR; INTRAVENOUS; SUBCUTANEOUS at 06:21

## 2018-01-16 RX ADMIN — Medication 100 MILLIGRAM(S): at 05:42

## 2018-01-16 RX ADMIN — HEPARIN SODIUM 5000 UNIT(S): 5000 INJECTION INTRAVENOUS; SUBCUTANEOUS at 05:41

## 2018-01-16 RX ADMIN — Medication 100 MILLIGRAM(S): at 21:46

## 2018-01-16 RX ADMIN — Medication 25 MILLIGRAM(S): at 11:16

## 2018-01-16 RX ADMIN — HEPARIN SODIUM 5000 UNIT(S): 5000 INJECTION INTRAVENOUS; SUBCUTANEOUS at 15:01

## 2018-01-16 RX ADMIN — OXYCODONE HYDROCHLORIDE 10 MILLIGRAM(S): 5 TABLET ORAL at 00:00

## 2018-01-16 RX ADMIN — OXYCODONE HYDROCHLORIDE 10 MILLIGRAM(S): 5 TABLET ORAL at 16:41

## 2018-01-16 RX ADMIN — INFLUENZA VIRUS VACCINE 0.5 MILLILITER(S): 15; 15; 15; 15 SUSPENSION INTRAMUSCULAR at 05:42

## 2018-01-16 RX ADMIN — HYDROMORPHONE HYDROCHLORIDE 1 MILLIGRAM(S): 2 INJECTION INTRAMUSCULAR; INTRAVENOUS; SUBCUTANEOUS at 11:15

## 2018-01-16 RX ADMIN — GABAPENTIN 300 MILLIGRAM(S): 400 CAPSULE ORAL at 11:16

## 2018-01-16 RX ADMIN — HYDROMORPHONE HYDROCHLORIDE 1 MILLIGRAM(S): 2 INJECTION INTRAMUSCULAR; INTRAVENOUS; SUBCUTANEOUS at 05:54

## 2018-01-16 NOTE — CONSULT NOTE ADULT - PROBLEM SELECTOR RECOMMENDATION 9
Physical Therapy eval and treat. WBAT  Consider a Steroid Taper  Pain management.  Call back as needed.   Thank you for the consultation.  No surgical intervention indicated at this time. Physical Therapy eval and treat. WBAT  Consider a Steroid Taper  Pain management. May Consider SANIA if needed and does not respond to above.   Call back as needed.   Thank you for the consultation.  Discussed with Dr. Moseley  No surgical intervention indicated at this time.

## 2018-01-16 NOTE — CONSULT NOTE ADULT - ASSESSMENT
Patient with worsening Lumbar Degenerative disc disease and mild right radicular pain with acute flare up.   No signs of acute injury. Patient with worsening Lumbar Degenerative disc disease and foraminal Stenosis at L2/3 and L4/5 on the right side and mild right radicular pain with acute flare up.   No signs of acute injury.
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
## 2018-01-16 NOTE — CONSULT NOTE ADULT - SUBJECTIVE AND OBJECTIVE BOX
[facility]                                                            Orthopedic Spine Consultation    CHIEF COMPLAINT: Low back pain.     HPI: Patient states that he has low back pain which has worsened over the last 10 days after he had a severe spasm in his neck and low back. He has a old injury to his cervical spine which causes spasms which he states can be quite extensive and includes cramping that causes him to double over. He reports radiating into the RLE along the lateral aspect of the RLE but does not go below the knee. He has occasional numbness to the plantar surface of his feet bilaterally. Pain is worse when he is lying flat and found tolerating the CT scan difficult.   No incontinence of bladder or bowel.       PAST MEDICAL & SURGICAL HISTORY:  Hypertension  No significant past surgical history          REVIEW OF SYSTEMS:    CONSTITUTIONAL: No fever, weight loss, or fatigue  SPINE: See HPI  RESPIRATORY: No shortness of breath  CARDIOVASCULAR: No chest pain, palpitations.  GASTROINTESTINAL: No abdominal or epigastric pain. No nausea, vomiting, No diarrhea or constipation.  GENITOURINARY: No dysuria, frequency, hematuria, or incontinence  NEUROLOGICAL: See HPI  ENDOCRINE: No heat or cold intolerance  MUSCULOSKELETAL: See HPI      Vital Signs Last 24 Hrs  T(C): 36.3 (2018 11:12), Max: 36.9 (15 Isidoro 2018 23:20)  T(F): 97.3 (2018 11:12), Max: 98.4 (15 Isidoro 2018 23:20)  HR: 83 (2018 11:30) (83 - 95)  BP: 109/65 (2018 11:30) (104/65 - 144/76)  BP(mean): --  RR: 16 (2018 11:30) (16 - 18)  SpO2: 97% (2018 11:12) (97% - 99%)  I&O's Detail    15 Isidoro 2018 07:01  -  2018 07:00  --------------------------------------------------------  IN:  Total IN: 0 mL    OUT:    Voided: 475 mL  Total OUT: 475 mL    Total NET: -475 mL          LABS:  Urinalysis Basic - ( 2018 23:55 )    Color: Yellow / Appearance: Clear / S.010 / pH: x  Gluc: x / Ketone: Small  / Bili: Negative / Urobili: Negative mg/dL   Blood: x / Protein: Negative mg/dL / Nitrite: Negative   Leuk Esterase: Negative / RBC: x / WBC x   Sq Epi: x / Non Sq Epi: x / Bacteria: x        RADIOLOGY & ADDITIONAL STUDIES: Compared to study of 17   Levoscoliosis of the lumbar spine. Severe intervertebral   disc height loss at L2/L3, L4/L5 with endplate spondylytic changes.   Multilevel posterior disc osteophyte complexes and facet arthropathy   result in severe right neural foraminal stenosis at L2/L3 and L4/L5, and   severe left-sided foraminal stenosis at L4/L5 and L5/S1. Mild to moderate   central spinal canal stenosis from L2/L3-L5/S1.       PHYSICAL EXAM:  Constitutional: NAD  HEENT:  Normal Hearing  Extremities: Moving lower extremities well with good power  Vascular: 2+ peripheral pulses  Psychiatric: Normal mood, normal affect      Lumbar: ROM : Not tested but patient has difficulty lying supine.   Neurological: A/O x  3          Sensation: [x] intact to light touch  [ ] decreased:          Motor exam: Moves LE well and with good power.  No focal deficits evident at this time.           Long Tract Findings: None present [facility]                                                            Orthopedic Spine Consultation    CHIEF COMPLAINT: Low back pain.     HPI: Patient states that he has low back pain which has worsened over the last 10 days after he had a severe spasm in his neck and low back. He has a old injury to his cervical spine which causes spasms which he states can be quite extensive and includes cramping that causes him to double over. He reports radiating into the RLE along the lateral aspect of the RLE but does not go below the knee. He has occasional numbness to the plantar surface of his feet bilaterally. Pain is worse when he is lying flat and found tolerating the CT scan difficult.   No incontinence of bladder or bowel.       PAST MEDICAL & SURGICAL HISTORY:  Hypertension  No significant past surgical history          REVIEW OF SYSTEMS:    CONSTITUTIONAL: No fever,   SPINE: See HPI  RESPIRATORY: No shortness of breath  CARDIOVASCULAR: No chest pain, palpitations.  GASTROINTESTINAL: No abdominal or epigastric pain. No nausea, vomiting, No diarrhea or constipation.  GENITOURINARY: No dysuria, incontinence  NEUROLOGICAL: See HPI  ENDOCRINE: No heat or cold intolerance  MUSCULOSKELETAL: See HPI      Vital Signs Last 24 Hrs  T(C): 36.3 (2018 11:12), Max: 36.9 (15 Isidoro 2018 23:20)  T(F): 97.3 (2018 11:12), Max: 98.4 (15 Isidoro 2018 23:20)  HR: 83 (2018 11:30) (83 - 95)  BP: 109/65 (2018 11:30) (104/65 - 144/76)  BP(mean): --  RR: 16 (2018 11:30) (16 - 18)  SpO2: 97% (2018 11:12) (97% - 99%)  I&O's Detail    15 Isidoro 2018 07:01  -  2018 07:00  --------------------------------------------------------  IN:  Total IN: 0 mL    OUT:    Voided: 475 mL  Total OUT: 475 mL    Total NET: -475 mL          LABS:  Urinalysis Basic - ( 2018 23:55 )    Color: Yellow / Appearance: Clear / S.010 / pH: x  Gluc: x / Ketone: Small  / Bili: Negative / Urobili: Negative mg/dL   Blood: x / Protein: Negative mg/dL / Nitrite: Negative   Leuk Esterase: Negative / RBC: x / WBC x   Sq Epi: x / Non Sq Epi: x / Bacteria: x        RADIOLOGY & ADDITIONAL STUDIES: Compared to study of 17   Levoscoliosis of the lumbar spine. Severe intervertebral   disc height loss at L2/L3, L4/L5 with endplate spondylytic changes.   Multilevel posterior disc osteophyte complexes and facet arthropathy   result in severe right neural foraminal stenosis at L2/L3 and L4/L5, and   severe left-sided foraminal stenosis at L4/L5 and L5/S1. Mild to moderate   central spinal canal stenosis from L2/L3-L5/S1.       PHYSICAL EXAM:  Constitutional: NAD  HEENT:  Normal Hearing  Extremities: Moving lower extremities well with good power  Vascular: 2+ peripheral pulses  Psychiatric: Normal mood, normal affect    Lumbar: ROM : Not tested but patient has difficulty lying supine.   Neurological: A/O x  3          Sensation: [x] intact to light touch  [ ] decreased:          Motor exam: Moves LE well and with good power.  No focal deficits evident at this time.           Long Tract Findings: None present [facility]                                                            Orthopedic Spine Consultation    CHIEF COMPLAINT: Low back pain.     HPI: Patient states that he has low back pain which has worsened over the last 10 days after he had a severe spasm in his neck and low back. He has a old injury to his cervical spine which causes spasms which he states can be quite extensive and includes cramping that causes him to double over. He reports radiating into the RLE along the lateral aspect of the RLE but does not go below the knee. He has occasional numbness to the plantar surface of his feet bilaterally. Pain is worse when he is lying flat and found tolerating the CT scan difficult. Patient has never had epidurals.   No incontinence of bladder or bowel.       PAST MEDICAL & SURGICAL HISTORY:  Hypertension  No significant past surgical history          REVIEW OF SYSTEMS:    CONSTITUTIONAL: No fever,   SPINE: See HPI  RESPIRATORY: No shortness of breath  CARDIOVASCULAR: No chest pain, palpitations.  GASTROINTESTINAL: No abdominal or epigastric pain. No nausea, vomiting, No diarrhea or constipation.  GENITOURINARY: No dysuria, incontinence  NEUROLOGICAL: See HPI  ENDOCRINE: No heat or cold intolerance  MUSCULOSKELETAL: See HPI      Vital Signs Last 24 Hrs  T(C): 36.3 (2018 11:12), Max: 36.9 (15 Isidoro 2018 23:20)  T(F): 97.3 (2018 11:12), Max: 98.4 (15 Isidoro 2018 23:20)  HR: 83 (2018 11:30) (83 - 95)  BP: 109/65 (2018 11:30) (104/65 - 144/76)  BP(mean): --  RR: 16 (2018 11:30) (16 - 18)  SpO2: 97% (2018 11:12) (97% - 99%)  I&O's Detail    15 Isidoro 2018 07:01  -  2018 07:00  --------------------------------------------------------  IN:  Total IN: 0 mL    OUT:    Voided: 475 mL  Total OUT: 475 mL    Total NET: -475 mL          LABS:  Urinalysis Basic - ( 2018 23:55 )    Color: Yellow / Appearance: Clear / S.010 / pH: x  Gluc: x / Ketone: Small  / Bili: Negative / Urobili: Negative mg/dL   Blood: x / Protein: Negative mg/dL / Nitrite: Negative   Leuk Esterase: Negative / RBC: x / WBC x   Sq Epi: x / Non Sq Epi: x / Bacteria: x        RADIOLOGY & ADDITIONAL STUDIES: Compared to study of 17   Levoscoliosis of the lumbar spine. Severe intervertebral   disc height loss at L2/L3, L4/L5 with endplate spondylytic changes.   Multilevel posterior disc osteophyte complexes and facet arthropathy   result in severe right neural foraminal stenosis at L2/L3 and L4/L5, and   severe left-sided foraminal stenosis at L4/L5 and L5/S1. Mild to moderate   central spinal canal stenosis from L2/L3-L5/S1.       PHYSICAL EXAM:  Constitutional: NAD  HEENT:  Normal Hearing  Extremities: Moving lower extremities well with good power  Vascular: 2+ peripheral pulses  Psychiatric: Normal mood, normal affect    Lumbar: ROM : Not tested but patient has difficulty lying supine.   Neurological: A/O x  3          Sensation: [x] intact to light touch  [ ] decreased:          Motor exam: Moves LE well and with good power.  No focal deficits evident at this time.           Long Tract Findings: None present

## 2018-01-17 RX ADMIN — Medication 25 MILLIGRAM(S): at 11:17

## 2018-01-17 RX ADMIN — HEPARIN SODIUM 5000 UNIT(S): 5000 INJECTION INTRAVENOUS; SUBCUTANEOUS at 22:24

## 2018-01-17 RX ADMIN — Medication 60 MILLIGRAM(S): at 11:17

## 2018-01-17 RX ADMIN — LURASIDONE HYDROCHLORIDE 60 MILLIGRAM(S): 40 TABLET ORAL at 11:17

## 2018-01-17 RX ADMIN — Medication 100 MILLIGRAM(S): at 07:15

## 2018-01-17 RX ADMIN — Medication 1 MILLIGRAM(S): at 11:17

## 2018-01-17 RX ADMIN — TAMSULOSIN HYDROCHLORIDE 0.4 MILLIGRAM(S): 0.4 CAPSULE ORAL at 23:05

## 2018-01-17 RX ADMIN — HEPARIN SODIUM 5000 UNIT(S): 5000 INJECTION INTRAVENOUS; SUBCUTANEOUS at 13:14

## 2018-01-17 RX ADMIN — Medication 1 TABLET(S): at 11:17

## 2018-01-17 RX ADMIN — OXYCODONE HYDROCHLORIDE 10 MILLIGRAM(S): 5 TABLET ORAL at 14:09

## 2018-01-17 RX ADMIN — Medication 100 MILLIGRAM(S): at 22:25

## 2018-01-17 RX ADMIN — HEPARIN SODIUM 5000 UNIT(S): 5000 INJECTION INTRAVENOUS; SUBCUTANEOUS at 07:16

## 2018-01-17 RX ADMIN — GABAPENTIN 300 MILLIGRAM(S): 400 CAPSULE ORAL at 11:17

## 2018-01-17 RX ADMIN — Medication 100 MILLIGRAM(S): at 11:17

## 2018-01-17 RX ADMIN — Medication 100 MILLIGRAM(S): at 13:14
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
## 2018-01-18 ENCOUNTER — TRANSCRIPTION ENCOUNTER (OUTPATIENT)
Age: 58
End: 2018-01-18

## 2018-01-18 VITALS
DIASTOLIC BLOOD PRESSURE: 81 MMHG | HEART RATE: 88 BPM | RESPIRATION RATE: 16 BRPM | TEMPERATURE: 98 F | OXYGEN SATURATION: 95 % | SYSTOLIC BLOOD PRESSURE: 126 MMHG

## 2018-01-18 RX ORDER — OXYCODONE HYDROCHLORIDE 5 MG/1
1 TABLET ORAL
Qty: 20 | Refills: 0 | OUTPATIENT
Start: 2018-01-18 | End: 2018-01-22

## 2018-01-18 RX ORDER — DOCUSATE SODIUM 100 MG
1 CAPSULE ORAL
Qty: 0 | Refills: 0 | COMMUNITY
Start: 2018-01-18

## 2018-01-18 RX ORDER — SENNA PLUS 8.6 MG/1
2 TABLET ORAL
Qty: 0 | Refills: 0 | COMMUNITY
Start: 2018-01-18

## 2018-01-18 RX ADMIN — Medication 1 MILLIGRAM(S): at 12:30

## 2018-01-18 RX ADMIN — LURASIDONE HYDROCHLORIDE 60 MILLIGRAM(S): 40 TABLET ORAL at 12:30

## 2018-01-18 RX ADMIN — Medication 60 MILLIGRAM(S): at 06:15

## 2018-01-18 RX ADMIN — HEPARIN SODIUM 5000 UNIT(S): 5000 INJECTION INTRAVENOUS; SUBCUTANEOUS at 06:19

## 2018-01-18 RX ADMIN — Medication 100 MILLIGRAM(S): at 12:30

## 2018-01-18 RX ADMIN — Medication 1 TABLET(S): at 12:30

## 2018-01-18 RX ADMIN — Medication 100 MILLIGRAM(S): at 06:15

## 2018-01-18 RX ADMIN — GABAPENTIN 300 MILLIGRAM(S): 400 CAPSULE ORAL at 12:30

## 2018-01-18 NOTE — DISCHARGE NOTE ADULT - MEDICATION SUMMARY - MEDICATIONS TO TAKE
I will START or STAY ON the medications listed below when I get home from the hospital:    predniSONE 20 mg oral tablet  -- 2 tab(s) by mouth once a day for 2 days, then 1 tab oral once a day for 2 days, then 1/2 tab oral once a day for 2 days.   -- Indication: For For bakc pain    oxyCODONE 10 mg oral tablet  -- 1 tab(s) by mouth every 6 hours, As needed, Moderate Pain (4 - 6)for pain MDD:4 tabs  -- Indication: For For back pain    traMADol 50 mg oral tablet  -- 1 tab(s) by mouth every 4 hours, As Needed  -- Indication: For Home med for pain    tamsulosin 0.4 mg oral capsule  -- 1 cap(s) by mouth once a day (at bedtime)  -- Indication: For Home med    gabapentin 300 mg oral capsule  -- 1 cap(s) by mouth once a day  -- Indication: For Home med    lurasidone 60 mg oral tablet  -- 1 tab(s) by mouth once a day  -- Indication: For David emed    senna oral tablet  -- 2 tab(s) by mouth once a day (at bedtime)  -- Indication: For constipation    docusate sodium 100 mg oral capsule  -- 1 cap(s) by mouth 3 times a day  -- Indication: For constipation    bethanechol 25 mg oral tablet  -- 1 tab(s) by mouth once a day  -- Indication: For Home med    Multiple Vitamins oral tablet  -- 1 tab(s) by mouth once a day  -- Indication: For Home med    folic acid 1 mg oral tablet  -- 1 tab(s) by mouth once a day  -- Indication: For David emed    thiamine 100 mg oral tablet  -- 1 tab(s) by mouth once a day  -- Indication: For Home med

## 2018-01-18 NOTE — DISCHARGE NOTE ADULT - CARE PROVIDER_API CALL
Nallely Moseley), Orthopaedic Surgery  [address] [place] [address]  Phone: [phone]  Fax: [phone]
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
## 2018-01-18 NOTE — DISCHARGE NOTE ADULT - CARE PLAN
Principal Discharge DX:	Intractable back pain  Goal:	improvement  Assessment and plan of treatment:	take meds as prescribed. follow up with spine if no improvement.

## 2018-01-18 NOTE — DISCHARGE NOTE ADULT - PATIENT PORTAL LINK FT
“You can access the Follow[facility] Patient Portal, offered by [facility], by registering with the following website: [email or web address]”

## 2018-01-21 RX ORDER — BETHANECHOL CHLORIDE 25 MG
1 TABLET ORAL
Qty: 30 | Refills: 1 | OUTPATIENT
Start: 2018-01-21

## 2018-01-21 RX ORDER — FOLIC ACID 0.8 MG
1 TABLET ORAL
Qty: 30 | Refills: 1 | OUTPATIENT
Start: 2018-01-21

## 2018-01-24 DIAGNOSIS — M48.07 SPINAL STENOSIS, LUMBOSACRAL REGION: ICD-10-CM

## 2018-01-24 DIAGNOSIS — F32.9 MAJOR DEPRESSIVE DISORDER, SINGLE EPISODE, UNSPECIFIED: ICD-10-CM

## 2018-01-24 DIAGNOSIS — I10 ESSENTIAL (PRIMARY) HYPERTENSION: ICD-10-CM

## 2018-01-24 DIAGNOSIS — R25.2 CRAMP AND SPASM: ICD-10-CM

## 2018-01-24 DIAGNOSIS — F41.9 ANXIETY DISORDER, UNSPECIFIED: ICD-10-CM

## 2018-01-24 DIAGNOSIS — M54.17 RADICULOPATHY, LUMBOSACRAL REGION: ICD-10-CM

## 2018-01-24 DIAGNOSIS — R62.7 ADULT FAILURE TO THRIVE: ICD-10-CM

## 2018-01-24 DIAGNOSIS — M54.2 CERVICALGIA: ICD-10-CM

## 2018-01-24 DIAGNOSIS — M54.9 DORSALGIA, UNSPECIFIED: ICD-10-CM

## 2018-01-24 DIAGNOSIS — M54.16 RADICULOPATHY, LUMBAR REGION: ICD-10-CM

## 2018-01-24 DIAGNOSIS — G89.29 OTHER CHRONIC PAIN: ICD-10-CM

## 2018-01-24 DIAGNOSIS — M48.061 SPINAL STENOSIS, LUMBAR REGION WITHOUT NEUROGENIC CLAUDICATION: ICD-10-CM

## 2018-01-24 DIAGNOSIS — G95.89 OTHER SPECIFIED DISEASES OF SPINAL CORD: ICD-10-CM

## 2018-06-29 ENCOUNTER — INPATIENT (INPATIENT)
Facility: HOSPITAL | Age: 58
LOS: 11 days | Discharge: HOME | End: 2018-07-11
Attending: PSYCHIATRY & NEUROLOGY | Admitting: PSYCHIATRY & NEUROLOGY
Payer: MEDICARE

## 2018-06-29 VITALS
TEMPERATURE: 98 F | SYSTOLIC BLOOD PRESSURE: 115 MMHG | HEART RATE: 102 BPM | DIASTOLIC BLOOD PRESSURE: 85 MMHG | HEIGHT: 69 IN | OXYGEN SATURATION: 97 % | WEIGHT: 190.04 LBS | RESPIRATION RATE: 17 BRPM

## 2018-06-29 DIAGNOSIS — F33.9 MAJOR DEPRESSIVE DISORDER, RECURRENT, UNSPECIFIED: ICD-10-CM

## 2018-06-29 DIAGNOSIS — F10.20 ALCOHOL DEPENDENCE, UNCOMPLICATED: ICD-10-CM

## 2018-06-29 PROBLEM — I10 ESSENTIAL (PRIMARY) HYPERTENSION: Chronic | Status: ACTIVE | Noted: 2018-01-13

## 2018-06-29 LAB
ADD ON TEST-SPECIMEN IN LAB: SIGNIFICANT CHANGE UP
ALBUMIN SERPL ELPH-MCNC: 3.6 G/DL — SIGNIFICANT CHANGE UP (ref 3.3–5)
ALP SERPL-CCNC: 64 U/L — SIGNIFICANT CHANGE UP (ref 40–120)
ALT FLD-CCNC: 16 U/L — SIGNIFICANT CHANGE UP (ref 12–78)
AMPHET UR-MCNC: NEGATIVE — SIGNIFICANT CHANGE UP
ANION GAP SERPL CALC-SCNC: 6 MMOL/L — SIGNIFICANT CHANGE UP (ref 5–17)
APAP SERPL-MCNC: <2 UG/ML — LOW (ref 10–30)
AST SERPL-CCNC: 41 U/L — HIGH (ref 15–37)
BARBITURATES UR SCN-MCNC: NEGATIVE — SIGNIFICANT CHANGE UP
BASOPHILS # BLD AUTO: 0.04 K/UL — SIGNIFICANT CHANGE UP (ref 0–0.2)
BASOPHILS NFR BLD AUTO: 0.8 % — SIGNIFICANT CHANGE UP (ref 0–2)
BENZODIAZ UR-MCNC: NEGATIVE — SIGNIFICANT CHANGE UP
BILIRUB SERPL-MCNC: 0.4 MG/DL — SIGNIFICANT CHANGE UP (ref 0.2–1.2)
BUN SERPL-MCNC: 9 MG/DL — SIGNIFICANT CHANGE UP (ref 7–23)
CALCIUM SERPL-MCNC: 8.6 MG/DL — SIGNIFICANT CHANGE UP (ref 8.5–10.1)
CHLORIDE SERPL-SCNC: 104 MMOL/L — SIGNIFICANT CHANGE UP (ref 96–108)
CO2 SERPL-SCNC: 31 MMOL/L — SIGNIFICANT CHANGE UP (ref 22–31)
COCAINE METAB.OTHER UR-MCNC: NEGATIVE — SIGNIFICANT CHANGE UP
CREAT SERPL-MCNC: 0.97 MG/DL — SIGNIFICANT CHANGE UP (ref 0.5–1.3)
EOSINOPHIL # BLD AUTO: 0.31 K/UL — SIGNIFICANT CHANGE UP (ref 0–0.5)
EOSINOPHIL NFR BLD AUTO: 6 % — SIGNIFICANT CHANGE UP (ref 0–6)
ETHANOL SERPL-MCNC: 104 MG/DL — HIGH (ref 0–10)
GLUCOSE BLDC GLUCOMTR-MCNC: 95 MG/DL — SIGNIFICANT CHANGE UP (ref 70–99)
GLUCOSE SERPL-MCNC: 92 MG/DL — SIGNIFICANT CHANGE UP (ref 70–99)
HBA1C BLD-MCNC: 5.2 % — SIGNIFICANT CHANGE UP (ref 4–5.6)
HCT VFR BLD CALC: 38.3 % — LOW (ref 39–50)
HGB BLD-MCNC: 13.2 G/DL — SIGNIFICANT CHANGE UP (ref 13–17)
IMM GRANULOCYTES NFR BLD AUTO: 0.4 % — SIGNIFICANT CHANGE UP (ref 0–1.5)
LYMPHOCYTES # BLD AUTO: 2.19 K/UL — SIGNIFICANT CHANGE UP (ref 1–3.3)
LYMPHOCYTES # BLD AUTO: 42.2 % — SIGNIFICANT CHANGE UP (ref 13–44)
MCHC RBC-ENTMCNC: 29.7 PG — SIGNIFICANT CHANGE UP (ref 27–34)
MCHC RBC-ENTMCNC: 34.5 GM/DL — SIGNIFICANT CHANGE UP (ref 32–36)
MCV RBC AUTO: 86.3 FL — SIGNIFICANT CHANGE UP (ref 80–100)
METHADONE UR-MCNC: NEGATIVE — SIGNIFICANT CHANGE UP
MONOCYTES # BLD AUTO: 0.52 K/UL — SIGNIFICANT CHANGE UP (ref 0–0.9)
MONOCYTES NFR BLD AUTO: 10 % — SIGNIFICANT CHANGE UP (ref 2–14)
NEUTROPHILS # BLD AUTO: 2.11 K/UL — SIGNIFICANT CHANGE UP (ref 1.8–7.4)
NEUTROPHILS NFR BLD AUTO: 40.6 % — LOW (ref 43–77)
NRBC # BLD: 0 /100 WBCS — SIGNIFICANT CHANGE UP (ref 0–0)
OPIATES UR-MCNC: NEGATIVE — SIGNIFICANT CHANGE UP
PCP SPEC-MCNC: SIGNIFICANT CHANGE UP
PCP UR-MCNC: NEGATIVE — SIGNIFICANT CHANGE UP
PLATELET # BLD AUTO: 239 K/UL — SIGNIFICANT CHANGE UP (ref 150–400)
POTASSIUM SERPL-MCNC: 3.8 MMOL/L — SIGNIFICANT CHANGE UP (ref 3.5–5.3)
POTASSIUM SERPL-SCNC: 3.8 MMOL/L — SIGNIFICANT CHANGE UP (ref 3.5–5.3)
PROT SERPL-MCNC: 6.8 GM/DL — SIGNIFICANT CHANGE UP (ref 6–8.3)
RBC # BLD: 4.44 M/UL — SIGNIFICANT CHANGE UP (ref 4.2–5.8)
RBC # FLD: 13.2 % — SIGNIFICANT CHANGE UP (ref 10.3–14.5)
SALICYLATES SERPL-MCNC: 2.2 MG/DL — LOW (ref 2.8–20)
SODIUM SERPL-SCNC: 141 MMOL/L — SIGNIFICANT CHANGE UP (ref 135–145)
THC UR QL: POSITIVE — SIGNIFICANT CHANGE UP
TSH SERPL-MCNC: 3.16 UIU/ML — SIGNIFICANT CHANGE UP (ref 0.36–3.74)
WBC # BLD: 5.19 K/UL — SIGNIFICANT CHANGE UP (ref 3.8–10.5)
WBC # FLD AUTO: 5.19 K/UL — SIGNIFICANT CHANGE UP (ref 3.8–10.5)

## 2018-06-29 PROCEDURE — 99285 EMERGENCY DEPT VISIT HI MDM: CPT

## 2018-06-29 PROCEDURE — 90792 PSYCH DIAG EVAL W/MED SRVCS: CPT

## 2018-06-29 PROCEDURE — 93010 ELECTROCARDIOGRAM REPORT: CPT

## 2018-06-29 RX ORDER — THIAMINE MONONITRATE (VIT B1) 100 MG
100 TABLET ORAL DAILY
Qty: 0 | Refills: 0 | Status: DISCONTINUED | OUTPATIENT
Start: 2018-06-29 | End: 2018-07-11

## 2018-06-29 RX ORDER — HALOPERIDOL DECANOATE 100 MG/ML
5 INJECTION INTRAMUSCULAR ONCE
Qty: 0 | Refills: 0 | Status: DISCONTINUED | OUTPATIENT
Start: 2018-06-29 | End: 2018-07-11

## 2018-06-29 RX ORDER — TRAMADOL HYDROCHLORIDE 50 MG/1
50 TABLET ORAL EVERY 6 HOURS
Qty: 0 | Refills: 0 | Status: DISCONTINUED | OUTPATIENT
Start: 2018-06-29 | End: 2018-06-30

## 2018-06-29 RX ORDER — LURASIDONE HYDROCHLORIDE 40 MG/1
60 TABLET ORAL DAILY
Qty: 0 | Refills: 0 | Status: DISCONTINUED | OUTPATIENT
Start: 2018-06-29 | End: 2018-07-11

## 2018-06-29 RX ORDER — GABAPENTIN 400 MG/1
300 CAPSULE ORAL DAILY
Qty: 0 | Refills: 0 | Status: DISCONTINUED | OUTPATIENT
Start: 2018-06-29 | End: 2018-07-11

## 2018-06-29 RX ORDER — FOLIC ACID 0.8 MG
1 TABLET ORAL DAILY
Qty: 0 | Refills: 0 | Status: DISCONTINUED | OUTPATIENT
Start: 2018-06-29 | End: 2018-07-11

## 2018-06-29 RX ORDER — DIPHENHYDRAMINE HCL 50 MG
50 CAPSULE ORAL ONCE
Qty: 0 | Refills: 0 | Status: DISCONTINUED | OUTPATIENT
Start: 2018-06-29 | End: 2018-07-11

## 2018-06-29 RX ADMIN — GABAPENTIN 300 MILLIGRAM(S): 400 CAPSULE ORAL at 13:47

## 2018-06-29 RX ADMIN — LURASIDONE HYDROCHLORIDE 60 MILLIGRAM(S): 40 TABLET ORAL at 13:45

## 2018-06-29 RX ADMIN — TRAMADOL HYDROCHLORIDE 50 MILLIGRAM(S): 50 TABLET ORAL at 21:41

## 2018-06-29 RX ADMIN — Medication 100 MILLIGRAM(S): at 13:46

## 2018-06-29 RX ADMIN — Medication 1 TABLET(S): at 13:47

## 2018-06-29 RX ADMIN — Medication 1 MILLIGRAM(S): at 13:45

## 2018-06-29 NOTE — ED ADULT NURSE NOTE - OBJECTIVE STATEMENT
Pt. c/o of SI because he states "I am so depressed I just don't think I want to live anymore." pt. denies HI, pt. states " I spoke about some things in group and it opened up some feelings and I just don't like it. It hurts so bad." Pt denies physical pain stating "I have physical issues, I have a spinal cord compression, but this depression hurts so bad I don't want to live anymore." pt. safety maintained, pt. placed in constant observation upon arrival in ED, pt. is tearful and crying loudly. pt. reassured about safety and that he is in a safe space.

## 2018-06-29 NOTE — ED BEHAVIORAL HEALTH ASSESSMENT NOTE - SUMMARY
59 yo man with long hx of depression and alcohol use presents with increasing depression and SI in context od potential loss of home, relapse onto susbstances
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
## 2018-06-29 NOTE — ED PROVIDER NOTE - CONSTITUTIONAL, MLM
normal... Well appearing, well nourished, awake, alert, oriented to person, place, time/situation; tearful

## 2018-06-29 NOTE — ED BEHAVIORAL HEALTH ASSESSMENT NOTE - OTHER PAST PSYCHIATRIC HISTORY (INCLUDE DETAILS REGARDING ONSET, COURSE OF ILLNESS, INPATIENT/OUTPATIENT TREATMENT)
Hospitalized in 2000 2004 at [facility]   March 2017 at [facility] for depression

## 2018-06-29 NOTE — ED PROVIDER NOTE - PROGRESS NOTE DETAILS
pt now medically clear but called teles[facility]ych and they have a number of patients to see, so pt will hold for am psych evaluation

## 2018-06-29 NOTE — PATIENT PROFILE BEHAVIORAL HEALTH - NSALCOHOLAMT_GEN_A_CORE_SD
Functional Status Done?:  yes                                Functional Status Needs Review?:  no    Specialty Provider's Name?:  Dr Bulmaro Zabala?:  Dermatology  Reason for Visit?:  Follow up  Diagnosis?:  Pre cancerous cells  Number of Vi 10 or more drinks

## 2018-06-29 NOTE — ED ADULT NURSE NOTE - CHPI ED SYMPTOMS NEG
no confusion/no homicidal/no agitation/no weakness/no hallucinations/no change in level of consciousness/no disorientation

## 2018-06-29 NOTE — ED PROVIDER NOTE - OBJECTIVE STATEMENT
57 yo male with h/o HTN and chronic neck and back pain presents to the Ed with multiple complaints.  Pt states he is fed up with his chronic pain and doesn't want to live like this any longer.  He feels increasingly depressed and hopeless and suicidal.  When asked if he tried to hurt himself, he replies "No I'm too chicken shit.  I can't do it.  I am too afraid to cut my wrists or slice my throat or jump in front of traffic.  I wish I had a gun.  I would just blow my head off.  But I don't know where to buy a gun.  Where do I buy a gun?"
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
## 2018-06-29 NOTE — ED ADULT TRIAGE NOTE - CHIEF COMPLAINT QUOTE
patient presents in ED from home with SI . patient has a history of chronic pain and states " I cant take it anymore I want to hurt myself".

## 2018-06-29 NOTE — PATIENT PROFILE BEHAVIORAL HEALTH - VISION (WITH CORRECTIVE LENSES IF THE PATIENT USUALLY WEARS THEM):
c/o poss cataract in R and replacement lens in L/Partially impaired: cannot see medication labels or newsprint, but can see obstacles in path, and the surrounding layout; can count fingers at arm's length

## 2018-06-30 LAB
CHOLEST SERPL-MCNC: 126 MG/DL — SIGNIFICANT CHANGE UP (ref 10–199)
HDLC SERPL-MCNC: 53 MG/DL — SIGNIFICANT CHANGE UP (ref 40–125)
LIPID PNL WITH DIRECT LDL SERPL: 46 MG/DL — SIGNIFICANT CHANGE UP
TOTAL CHOLESTEROL/HDL RATIO MEASUREMENT: 2.4 RATIO — LOW (ref 3.4–9.6)
TRIGL SERPL-MCNC: 136 MG/DL — SIGNIFICANT CHANGE UP (ref 10–149)

## 2018-06-30 RX ORDER — LIDOCAINE 4 G/100G
2 CREAM TOPICAL DAILY
Qty: 0 | Refills: 0 | Status: DISCONTINUED | OUTPATIENT
Start: 2018-06-30 | End: 2018-07-11

## 2018-06-30 RX ADMIN — GABAPENTIN 300 MILLIGRAM(S): 400 CAPSULE ORAL at 09:07

## 2018-06-30 RX ADMIN — LURASIDONE HYDROCHLORIDE 60 MILLIGRAM(S): 40 TABLET ORAL at 09:06

## 2018-06-30 RX ADMIN — Medication 100 MILLIGRAM(S): at 09:06

## 2018-06-30 RX ADMIN — TRAMADOL HYDROCHLORIDE 50 MILLIGRAM(S): 50 TABLET ORAL at 20:26

## 2018-06-30 RX ADMIN — TRAMADOL HYDROCHLORIDE 50 MILLIGRAM(S): 50 TABLET ORAL at 09:13

## 2018-06-30 RX ADMIN — Medication 1 TABLET(S): at 09:06

## 2018-06-30 NOTE — BEHAVIORAL HEALTH ASSESSMENT NOTE - NSBHCHARTREVIEWVS_PSY_A_CORE FT
Vital Signs Last 24 Hrs  T(C): 36.4 (30 Jun 2018 08:00), Max: 36.4 (30 Jun 2018 08:00)  T(F): 97.5 (30 Jun 2018 08:00), Max: 97.5 (30 Jun 2018 08:00)  HR: 82 (30 Jun 2018 08:00) (82 - 82)  BP: 151/87 (30 Jun 2018 08:00) (151/87 - 151/87)  BP(mean): --  RR: 14 (30 Jun 2018 08:00) (14 - 14)  SpO2: 99% (30 Jun 2018 08:00) (99% - 99%)
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
## 2018-06-30 NOTE — BEHAVIORAL HEALTH ASSESSMENT NOTE - DETAILS
Hx of DTs back pain  Problems walking thoughts to shoot self mother and sibling with substance use thoughts to shoot self but I don't have a gun and I'm afraid what if I miss."

## 2018-06-30 NOTE — BEHAVIORAL HEALTH ASSESSMENT NOTE - SUMMARY
57 yo man with long hx of depression and alcohol use presents with increasing depression and SI in context od potential loss of home, relapse onto susbstances 57 yo man with long hx of depression, alcohol use, family hx of alcohol use, who is now facing homelessness and lack of support.  Pt with increased sense of loneliness and increased thoughts of suicide but for the fear of disability or that the attempt would fail .  Pt with intense lability of mood and affect , dysphoria and periods of amotivation and inability to care for himself.

## 2018-06-30 NOTE — BEHAVIORAL HEALTH ASSESSMENT NOTE - NSBHCHARTREVIEWLAB_PSY_A_CORE FT
13.2   5.19  )-----------( 239      ( 29 Jun 2018 04:00 )             38.3     CBC Full  -  ( 29 Jun 2018 04:00 )  WBC Count : 5.19 K/uL  Hemoglobin : 13.2 g/dL  Hematocrit : 38.3 %  Platelet Count - Automated : 239 K/uL  Mean Cell Volume : 86.3 fl  Mean Cell Hemoglobin : 29.7 pg  Mean Cell Hemoglobin Concentration : 34.5 gm/dL  Auto Neutrophil # : 2.11 K/uL  Auto Lymphocyte # : 2.19 K/uL  Auto Monocyte # : 0.52 K/uL  Auto Eosinophil # : 0.31 K/uL  Auto Basophil # : 0.04 K/uL  Auto Neutrophil % : 40.6 %  Auto Lymphocyte % : 42.2 %  Auto Monocyte % : 10.0 %  Auto Eosinophil % : 6.0 %  Auto Basophil % : 0.8 %

## 2018-07-01 LAB
T3 SERPL-MCNC: 151 NG/DL — SIGNIFICANT CHANGE UP (ref 80–200)
T4 AB SER-ACNC: 8.7 UG/DL — SIGNIFICANT CHANGE UP (ref 4.6–12)
TSH SERPL-MCNC: 4.16 UU/ML — HIGH (ref 0.36–3.74)

## 2018-07-01 RX ORDER — TRAMADOL HYDROCHLORIDE 50 MG/1
50 TABLET ORAL EVERY 6 HOURS
Qty: 0 | Refills: 0 | Status: DISCONTINUED | OUTPATIENT
Start: 2018-07-01 | End: 2018-07-08

## 2018-07-01 RX ADMIN — TRAMADOL HYDROCHLORIDE 50 MILLIGRAM(S): 50 TABLET ORAL at 23:42

## 2018-07-01 RX ADMIN — Medication 1 MILLIGRAM(S): at 09:01

## 2018-07-01 RX ADMIN — TRAMADOL HYDROCHLORIDE 50 MILLIGRAM(S): 50 TABLET ORAL at 16:01

## 2018-07-01 RX ADMIN — LIDOCAINE 2 PATCH: 4 CREAM TOPICAL at 22:41

## 2018-07-01 RX ADMIN — TRAMADOL HYDROCHLORIDE 50 MILLIGRAM(S): 50 TABLET ORAL at 19:10

## 2018-07-01 RX ADMIN — LIDOCAINE 2 PATCH: 4 CREAM TOPICAL at 09:08

## 2018-07-01 RX ADMIN — Medication 1 TABLET(S): at 09:01

## 2018-07-01 RX ADMIN — LURASIDONE HYDROCHLORIDE 60 MILLIGRAM(S): 40 TABLET ORAL at 09:01

## 2018-07-01 RX ADMIN — Medication 100 MILLIGRAM(S): at 09:01

## 2018-07-01 RX ADMIN — TRAMADOL HYDROCHLORIDE 50 MILLIGRAM(S): 50 TABLET ORAL at 21:28

## 2018-07-01 RX ADMIN — GABAPENTIN 300 MILLIGRAM(S): 400 CAPSULE ORAL at 09:01

## 2018-07-01 NOTE — H&P ADULT - NSHPLABSRESULTS_GEN_ALL_CORE
labs 6/29 reviewed    THC, Urine Qualitative (06.29.18 @ 03:06)    THC, Urine Qualitative: Positive      MEDICATIONS  (STANDING):  folic acid 1 milliGRAM(s) Oral daily  gabapentin 300 milliGRAM(s) Oral daily  lidocaine   Patch 2 Patch Transdermal daily  lurasidone 60 milliGRAM(s) Oral daily  multivitamin 1 Tablet(s) Oral daily  thiamine 100 milliGRAM(s) Oral daily    MEDICATIONS  (PRN):  diphenhydrAMINE   Injectable 50 milliGRAM(s) IntraMuscular once PRN Agitation  haloperidol    Injectable 5 milliGRAM(s) IntraMuscular once PRN Agitation  LORazepam     Tablet 2 milliGRAM(s) Oral every 2 hours PRN CIWA-Ar score increase by 2 points and a total score of 7 or less  LORazepam     Tablet 2 milliGRAM(s) Oral every 1 hour PRN CIWA-Ar score 8 or greater  LORazepam     Tablet 2 milliGRAM(s) Oral every 2 hours PRN Symptom-triggered 2 point increase in CIWA-Ar  LORazepam     Tablet 2 milliGRAM(s) Oral every 1 hour PRN Symptom-triggered: each CIWA -Ar score 8 or GREATER  LORazepam   Injectable 2 milliGRAM(s) IntraMuscular once PRN Agitation  traMADol 50 milliGRAM(s) Oral every 6 hours PRN Moderate Pain (4 - 6)

## 2018-07-01 NOTE — H&P ADULT - HISTORY OF PRESENT ILLNESS
58y male w/ PMH chronic neck/back pain, etoh use who was admitted to [facility] w/ suicidal thoughts. 58y male w/ PMH chronic neck/back pain, etoh use who was admitted to [facility] w/ suicidal thoughts.     No complaints.

## 2018-07-01 NOTE — PROGRESS NOTE BEHAVIORAL HEALTH - NSBHFUPSUICRISKFCTR_PSY_A_CORE
Mood episode/Agitation/severe anxiety/Chronic pain or acute medical issue/Substance abuse/dependence

## 2018-07-01 NOTE — H&P ADULT - NSHPPHYSICALEXAM_GEN_ALL_CORE
Vital Signs Last 24 Hrs  T(C): 36.3 (01 Jul 2018 07:29), Max: 36.3 (01 Jul 2018 07:29)  T(F): 97.3 (01 Jul 2018 07:29), Max: 97.3 (01 Jul 2018 07:29)  HR: 95 (01 Jul 2018 07:29) (95 - 95)  BP: 136/80 (01 Jul 2018 07:29) (136/80 - 136/80)  BP(mean): --  RR: 14 (01 Jul 2018 07:29) (14 - 14)  SpO2: 98% (01 Jul 2018 07:29) (98% - 98%) Vital Signs Last 24 Hrs  T(C): 36.3 (01 Jul 2018 07:29), Max: 36.3 (01 Jul 2018 07:29)  T(F): 97.3 (01 Jul 2018 07:29), Max: 97.3 (01 Jul 2018 07:29)  HR: 95 (01 Jul 2018 07:29) (95 - 95)  BP: 136/80 (01 Jul 2018 07:29) (136/80 - 136/80)  BP(mean): --  RR: 14 (01 Jul 2018 07:29) (14 - 14)  SpO2: 98% (01 Jul 2018 07:29) (98% - 98%)    physical: Vital Signs Last 24 Hrs  T(C): 36.3 (01 Jul 2018 07:29), Max: 36.3 (01 Jul 2018 07:29)  T(F): 97.3 (01 Jul 2018 07:29), Max: 97.3 (01 Jul 2018 07:29)  HR: 95 (01 Jul 2018 07:29) (95 - 95)  BP: 136/80 (01 Jul 2018 07:29) (136/80 - 136/80)  BP(mean): --  RR: 14 (01 Jul 2018 07:29) (14 - 14)  SpO2: 98% (01 Jul 2018 07:29) (98% - 98%)      PHYSICAL EXAM:  General: NAD, comfortable  Neuro: AAOx3  HEENT: NCAT   Neck: supple  Respiratory: CTA b/l  Cardiovascular: S1 and S2, RRR  Gastrointestinal: +BS, non ttp  Extremities: No edema  Musculoskeletal: full rom, ambulating
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
## 2018-07-01 NOTE — H&P ADULT - NSHPREVIEWOFSYSTEMS_GEN_ALL_CORE
General: No weakness, fevers, chills  HEENT: No HA, neck pain, no visual changes, no hearing loss   Resp: No cough, wheezing, hemoptysis; No shortness of breath  CV: No chest pain or palpitations  GI: No abdominal pain, no n/v/d, no blood in stool  : No f/u/d  Neuro: No weakness or numbness  MSK: No myalgias, arthralgias  SKIN: No itching, rashes, lesions  All other ROS negative unless indicated above.

## 2018-07-02 RX ORDER — LORATADINE 10 MG/1
10 TABLET ORAL DAILY
Qty: 0 | Refills: 0 | Status: DISCONTINUED | OUTPATIENT
Start: 2018-07-02 | End: 2018-07-11

## 2018-07-02 RX ORDER — ACETAMINOPHEN 500 MG
650 TABLET ORAL EVERY 6 HOURS
Qty: 0 | Refills: 0 | Status: DISCONTINUED | OUTPATIENT
Start: 2018-07-02 | End: 2018-07-11

## 2018-07-02 RX ORDER — LITHIUM CARBONATE 300 MG/1
150 TABLET, EXTENDED RELEASE ORAL
Qty: 0 | Refills: 0 | Status: DISCONTINUED | OUTPATIENT
Start: 2018-07-02 | End: 2018-07-02

## 2018-07-02 RX ORDER — LITHIUM CARBONATE 300 MG/1
300 TABLET, EXTENDED RELEASE ORAL
Qty: 0 | Refills: 0 | Status: DISCONTINUED | OUTPATIENT
Start: 2018-07-02 | End: 2018-07-11

## 2018-07-02 RX ADMIN — LIDOCAINE 2 PATCH: 4 CREAM TOPICAL at 21:35

## 2018-07-02 RX ADMIN — TRAMADOL HYDROCHLORIDE 50 MILLIGRAM(S): 50 TABLET ORAL at 17:21

## 2018-07-02 RX ADMIN — LORATADINE 10 MILLIGRAM(S): 10 TABLET ORAL at 21:06

## 2018-07-02 RX ADMIN — Medication 1 MILLIGRAM(S): at 09:22

## 2018-07-02 RX ADMIN — Medication 1 TABLET(S): at 09:22

## 2018-07-02 RX ADMIN — Medication 2 MILLIGRAM(S): at 21:32

## 2018-07-02 RX ADMIN — TRAMADOL HYDROCHLORIDE 50 MILLIGRAM(S): 50 TABLET ORAL at 10:13

## 2018-07-02 RX ADMIN — Medication 100 MILLIGRAM(S): at 09:22

## 2018-07-02 RX ADMIN — LITHIUM CARBONATE 300 MILLIGRAM(S): 300 TABLET, EXTENDED RELEASE ORAL at 21:06

## 2018-07-02 RX ADMIN — LURASIDONE HYDROCHLORIDE 60 MILLIGRAM(S): 40 TABLET ORAL at 09:22

## 2018-07-02 RX ADMIN — GABAPENTIN 300 MILLIGRAM(S): 400 CAPSULE ORAL at 09:22

## 2018-07-02 RX ADMIN — LIDOCAINE 2 PATCH: 4 CREAM TOPICAL at 09:22

## 2018-07-02 RX ADMIN — Medication 650 MILLIGRAM(S): at 18:02

## 2018-07-02 NOTE — PHYSICAL THERAPY INITIAL EVALUATION ADULT - ADDITIONAL COMMENTS
does not drive. calls for transport to MD appt and calls for taxi for food shopping. Has cane, walker and wc at home. Lives in house alone and has 5 JULIANA with B HR.
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
## 2018-07-02 NOTE — PHYSICAL THERAPY INITIAL EVALUATION ADULT - GENERAL OBSERVATIONS, REHAB EVAL
Pre and post session: supine in bed. Stated has a cold, congestion with increased coughing. pt perspiring and observed facial redness . nsg aware. c/o pain from spinal compression c-spine. States take pain meds at home. Nsg also aware. Tolerated well and would benefit from further skilled PT for functional mobility training. Pre and post session: supine in bed. Stated has a cold, congestion with increased coughing. pt perspiring and observed facial redness . nsg aware. c/o pain from spinal compression c-spine. States take pain meds at home. Nsg also aware. Tolerated well and would benefit from further skilled PT for functional mobility training
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
## 2018-07-03 PROCEDURE — 99232 SBSQ HOSP IP/OBS MODERATE 35: CPT

## 2018-07-03 RX ORDER — MAGNESIUM HYDROXIDE 400 MG/1
30 TABLET, CHEWABLE ORAL DAILY
Qty: 0 | Refills: 0 | Status: DISCONTINUED | OUTPATIENT
Start: 2018-07-03 | End: 2018-07-11

## 2018-07-03 RX ADMIN — Medication 1 TABLET(S): at 09:34

## 2018-07-03 RX ADMIN — LORATADINE 10 MILLIGRAM(S): 10 TABLET ORAL at 09:34

## 2018-07-03 RX ADMIN — LITHIUM CARBONATE 300 MILLIGRAM(S): 300 TABLET, EXTENDED RELEASE ORAL at 21:43

## 2018-07-03 RX ADMIN — TRAMADOL HYDROCHLORIDE 50 MILLIGRAM(S): 50 TABLET ORAL at 17:39

## 2018-07-03 RX ADMIN — Medication 100 MILLIGRAM(S): at 09:33

## 2018-07-03 RX ADMIN — MAGNESIUM HYDROXIDE 30 MILLILITER(S): 400 TABLET, CHEWABLE ORAL at 16:53

## 2018-07-03 RX ADMIN — LITHIUM CARBONATE 300 MILLIGRAM(S): 300 TABLET, EXTENDED RELEASE ORAL at 09:33

## 2018-07-03 RX ADMIN — Medication 1 MILLIGRAM(S): at 09:33

## 2018-07-03 RX ADMIN — TRAMADOL HYDROCHLORIDE 50 MILLIGRAM(S): 50 TABLET ORAL at 18:39

## 2018-07-03 RX ADMIN — LURASIDONE HYDROCHLORIDE 60 MILLIGRAM(S): 40 TABLET ORAL at 09:34

## 2018-07-03 RX ADMIN — GABAPENTIN 300 MILLIGRAM(S): 400 CAPSULE ORAL at 09:33

## 2018-07-03 RX ADMIN — TRAMADOL HYDROCHLORIDE 50 MILLIGRAM(S): 50 TABLET ORAL at 09:59

## 2018-07-04 DIAGNOSIS — I10 ESSENTIAL (PRIMARY) HYPERTENSION: ICD-10-CM

## 2018-07-04 DIAGNOSIS — R45.851 SUICIDAL IDEATIONS: ICD-10-CM

## 2018-07-04 DIAGNOSIS — Z91.19 PATIENT'S NONCOMPLIANCE WITH OTHER MEDICAL TREATMENT AND REGIMEN: ICD-10-CM

## 2018-07-04 PROCEDURE — 99232 SBSQ HOSP IP/OBS MODERATE 35: CPT

## 2018-07-04 RX ADMIN — LITHIUM CARBONATE 300 MILLIGRAM(S): 300 TABLET, EXTENDED RELEASE ORAL at 09:19

## 2018-07-04 RX ADMIN — TRAMADOL HYDROCHLORIDE 50 MILLIGRAM(S): 50 TABLET ORAL at 17:05

## 2018-07-04 RX ADMIN — LORATADINE 10 MILLIGRAM(S): 10 TABLET ORAL at 09:26

## 2018-07-04 RX ADMIN — Medication 100 MILLIGRAM(S): at 09:21

## 2018-07-04 RX ADMIN — LITHIUM CARBONATE 300 MILLIGRAM(S): 300 TABLET, EXTENDED RELEASE ORAL at 21:21

## 2018-07-04 RX ADMIN — Medication 1 MILLIGRAM(S): at 09:19

## 2018-07-04 RX ADMIN — TRAMADOL HYDROCHLORIDE 50 MILLIGRAM(S): 50 TABLET ORAL at 09:25

## 2018-07-04 RX ADMIN — Medication 1 TABLET(S): at 09:21

## 2018-07-04 RX ADMIN — TRAMADOL HYDROCHLORIDE 50 MILLIGRAM(S): 50 TABLET ORAL at 10:20

## 2018-07-04 RX ADMIN — TRAMADOL HYDROCHLORIDE 50 MILLIGRAM(S): 50 TABLET ORAL at 16:11

## 2018-07-04 RX ADMIN — GABAPENTIN 300 MILLIGRAM(S): 400 CAPSULE ORAL at 09:20

## 2018-07-04 RX ADMIN — LURASIDONE HYDROCHLORIDE 60 MILLIGRAM(S): 40 TABLET ORAL at 09:20

## 2018-07-05 PROCEDURE — 99232 SBSQ HOSP IP/OBS MODERATE 35: CPT

## 2018-07-05 RX ORDER — DOCUSATE SODIUM 100 MG
100 CAPSULE ORAL
Qty: 0 | Refills: 0 | Status: DISCONTINUED | OUTPATIENT
Start: 2018-07-05 | End: 2018-07-11

## 2018-07-05 RX ORDER — SENNA PLUS 8.6 MG/1
2 TABLET ORAL AT BEDTIME
Qty: 0 | Refills: 0 | Status: DISCONTINUED | OUTPATIENT
Start: 2018-07-05 | End: 2018-07-11

## 2018-07-05 RX ADMIN — Medication 100 MILLIGRAM(S): at 09:07

## 2018-07-05 RX ADMIN — TRAMADOL HYDROCHLORIDE 50 MILLIGRAM(S): 50 TABLET ORAL at 22:09

## 2018-07-05 RX ADMIN — Medication 1 MILLIGRAM(S): at 09:05

## 2018-07-05 RX ADMIN — TRAMADOL HYDROCHLORIDE 50 MILLIGRAM(S): 50 TABLET ORAL at 23:39

## 2018-07-05 RX ADMIN — LURASIDONE HYDROCHLORIDE 60 MILLIGRAM(S): 40 TABLET ORAL at 09:06

## 2018-07-05 RX ADMIN — GABAPENTIN 300 MILLIGRAM(S): 400 CAPSULE ORAL at 09:06

## 2018-07-05 RX ADMIN — MAGNESIUM HYDROXIDE 30 MILLILITER(S): 400 TABLET, CHEWABLE ORAL at 11:12

## 2018-07-05 RX ADMIN — Medication 100 MILLIGRAM(S): at 21:36

## 2018-07-05 RX ADMIN — TRAMADOL HYDROCHLORIDE 50 MILLIGRAM(S): 50 TABLET ORAL at 15:56

## 2018-07-05 RX ADMIN — LITHIUM CARBONATE 300 MILLIGRAM(S): 300 TABLET, EXTENDED RELEASE ORAL at 21:36

## 2018-07-05 RX ADMIN — Medication 1 TABLET(S): at 09:06

## 2018-07-05 RX ADMIN — SENNA PLUS 2 TABLET(S): 8.6 TABLET ORAL at 21:36

## 2018-07-05 RX ADMIN — LORATADINE 10 MILLIGRAM(S): 10 TABLET ORAL at 09:06

## 2018-07-05 RX ADMIN — LITHIUM CARBONATE 300 MILLIGRAM(S): 300 TABLET, EXTENDED RELEASE ORAL at 09:06

## 2018-07-05 RX ADMIN — TRAMADOL HYDROCHLORIDE 50 MILLIGRAM(S): 50 TABLET ORAL at 09:07

## 2018-07-05 NOTE — DISCHARGE NOTE BEHAVIORAL HEALTH - NSBHDCADMRISKMITFT_PSY_A_CORE
1. Pt is directed to continue with all medications until directed by his MD to change or discontinued with medications

## 2018-07-05 NOTE — DISCHARGE NOTE BEHAVIORAL HEALTH - MEDICATION SUMMARY - MEDICATIONS TO STOP TAKING
I will STOP taking the medications listed below when I get home from the hospital:    tamsulosin 0.4 mg oral capsule  -- 1 cap(s) by mouth once a day (at bedtime)    bethanechol 25 mg oral tablet  -- 1 tab(s) by mouth once a day    Multiple Vitamins oral tablet  -- 1 tab(s) by mouth once a day    folic acid 1 mg oral tablet  -- 1 tab(s) by mouth once a day    thiamine 100 mg oral tablet  -- 1 tab(s) by mouth once a day    predniSONE 20 mg oral tablet  -- 2 tab(s) by mouth once a day for 2 days, then 1 tab oral once a day for 2 days, then 1/2 tab oral once a day for 2 days.    oxyCODONE 10 mg oral tablet  -- 1 tab(s) by mouth every 6 hours, As needed, Moderate Pain (4 - 6)for pain MDD:4 tabs    senna oral tablet  -- 2 tab(s) by mouth once a day (at bedtime)    docusate sodium 100 mg oral capsule  -- 1 cap(s) by mouth 3 times a day    bethanechol 25 mg oral tablet  -- 1 tab(s) by mouth once a day   -- May cause drowsiness or dizziness.  Take medication on an empty stomach 1 hour before or 2 to 3 hours after a meal unless otherwise directed by your doctor.  This drug may impair the ability to drive or operate machinery.  Use care until you become familiar with its effects.    folic acid 1 mg oral tablet  -- 1 tab(s) by mouth once a day

## 2018-07-05 NOTE — DISCHARGE NOTE BEHAVIORAL HEALTH - PAST PSYCHIATRIC HISTORY
57 yo man with long hx of depression and alcohol use presents with increasing depression and SI in context od potential loss of home, relapse onto susbstances    Hospitalized in 2000 2004 at [facility]   March 2017 at [facility] for depression

## 2018-07-05 NOTE — PROGRESS NOTE BEHAVIORAL HEALTH - PROBLEM SELECTOR PLAN 4
1. continued monitoring of the vitals
1. continued monitoring of the vitals
1. contined monitoring of the vitals

## 2018-07-05 NOTE — DISCHARGE NOTE BEHAVIORAL HEALTH - NSBHDCCONDITIONFT_PSY_A_CORE
MEDICATIONS  (STANDING):  docusate sodium 100 milliGRAM(s) Oral two times a day  folic acid 1 milliGRAM(s) Oral daily  gabapentin 300 milliGRAM(s) Oral daily  lidocaine   Patch 2 Patch Transdermal daily  lithium 300 milliGRAM(s) Oral two times a day  loratadine 10 milliGRAM(s) Oral daily  lurasidone 60 milliGRAM(s) Oral daily  multivitamin 1 Tablet(s) Oral daily  senna 2 Tablet(s) Oral at bedtime  thiamine 100 milliGRAM(s) Oral daily  Pt with improved mood and affect Pt denies any suicidal ideation or plan.  Pt denies any side effect from medication

## 2018-07-05 NOTE — DISCHARGE NOTE BEHAVIORAL HEALTH - NSBHDCMEDSFT_PSY_A_CORE
MEDICATIONS  (STANDING):  docusate sodium 100 milliGRAM(s) Oral two times a day  folic acid 1 milliGRAM(s) Oral daily  gabapentin 300 milliGRAM(s) Oral daily  lidocaine   Patch 2 Patch Transdermal daily  lithium 300 milliGRAM(s) Oral two times a day  loratadine 10 milliGRAM(s) Oral daily  lurasidone 60 milliGRAM(s) Oral daily  multivitamin 1 Tablet(s) Oral daily  senna 2 Tablet(s) Oral at bedtime  thiamine 100 milliGRAM(s) Oral daily  Pt with good response to latuda and lithium augmentation .  Pt with denial of any side effect Pt with improved motivation , better quality of sleep. Pt with long hx of prior antidepressant treatment failure.  Pt with denial of any suicidal ideation or plan. Pt is directed to continue with all medications until directed by his MD to change or discontinued with medications.

## 2018-07-05 NOTE — DISCHARGE NOTE BEHAVIORAL HEALTH - NSBHDCRESPONSEFT_PSY_A_CORE
MEDICATIONS  (STANDING):  docusate sodium 100 milliGRAM(s) Oral two times a day  folic acid 1 milliGRAM(s) Oral daily  gabapentin 300 milliGRAM(s) Oral daily  lidocaine   Patch 2 Patch Transdermal daily  lithium 300 milliGRAM(s) Oral two times a day  loratadine 10 milliGRAM(s) Oral daily  lurasidone 60 milliGRAM(s) Oral daily  multivitamin 1 Tablet(s) Oral daily  senna 2 Tablet(s) Oral at bedtime  thiamine 100 milliGRAM(s) Oral daily MEDICATIONS  (STANDING):  docusate sodium 100 milliGRAM(s) Oral two times a day  folic acid 1 milliGRAM(s) Oral daily  gabapentin 300 milliGRAM(s) Oral daily    lidocaine   Patch 2 Patch Transdermal daily  lithium 300 milliGRAM(s) Oral two times a day  loratadine 10 milliGRAM(s) Oral daily  lurasidone 60 milliGRAM(s) Oral daily  multivitamin 1 Tablet(s) Oral daily  senna 2 Tablet(s) Oral at bedtime  thiamine 100 milliGRAM(s) Oral daily    Pt with euthymic mood and full affect Pt denies any suicidal ideation or plan Pt with denial of any side effects from medication Pt denies any hallucination Pt with no delusions . Pt is directed to continue with all medications until directed by his MD to change or discontinued with medications

## 2018-07-05 NOTE — DISCHARGE NOTE BEHAVIORAL HEALTH - MEDICATION SUMMARY - MEDICATIONS TO CHANGE
I will SWITCH the dose or number of times a day I take the medications listed below when I get home from the hospital:    gabapentin 300 mg oral capsule  -- 1 cap(s) by mouth once a day

## 2018-07-05 NOTE — DISCHARGE NOTE BEHAVIORAL HEALTH - NSBHDCCRISISPLAN1FT_PSY_A_CORE
Call 911, go to the nearest emergency room, call my MD or Therapist, tell a trusted friend or family member, call the crisis intervention number provided

## 2018-07-05 NOTE — DISCHARGE NOTE BEHAVIORAL HEALTH - MEDICATION SUMMARY - MEDICATIONS TO TAKE
I will START or STAY ON the medications listed below when I get home from the hospital:    traMADol 50 mg oral tablet  -- 1 tab(s) by mouth every 8 hours, As needed, Moderate Pain (4 - 6) MDD:150mg  a day  -- Indication: For chronic pain    gabapentin 300 mg oral capsule  -- 1 cap(s) by mouth 3 times a day   -- Indication: For pain    lithium 300 mg oral capsule  -- 1 cap(s) by mouth 2 times a day  -- Indication: For Major depression, recurrent, chronic    lurasidone 60 mg oral tablet  -- 1 tab(s) by mouth once a day  -- Indication: For Major depression, recurrent, chronic    lidocaine 5% topical film  -- Apply on skin to affected area once a day   -- Indication: For pain

## 2018-07-05 NOTE — DISCHARGE NOTE BEHAVIORAL HEALTH - NSBHDCSUBSTHXFT_PSY_A_CORE
beer	     28-Jun-2018	     4 or more times/wk 	     10 or more drinks 	   alcohol dependence in tx at [facility] three times a weak       hx of depression and alcohol dependence in tx at [facility] three times a weak  drank 12 beers before coming to the hospital

## 2018-07-05 NOTE — DISCHARGE NOTE BEHAVIORAL HEALTH - NSBHDCSWCOMMENTSFT_PSY_A_CORE
Patient educated to not stop any medications or tx unless discussed with outpatient MD first.  Pt educated about community self help programs for substance abuse if needed.  Pt educated about safety planning and crisis hot line numbers if needed.

## 2018-07-05 NOTE — DISCHARGE NOTE BEHAVIORAL HEALTH - NSBHDCADDR1FT_A_CORE
[address] [place], [place] [id number] [address] [place] [address]  *Patient has a weekly appointment with therapist Mayank Jennings on Mondays at 2pm but therapist is on vacation next week and a one time appointment is set up for [address] [place] [address]  *Patient has a weekly appointment with therapist Mayank Jennings on Mondays at 2pm but therapist is on vacation next week and a one time appointment is set up for Friday, 7/13 at 11:30am with Mayank LEVINE

## 2018-07-05 NOTE — DISCHARGE NOTE BEHAVIORAL HEALTH - NSBHDCADMRISKREASONS_PSY_A_CORE
Non-adherence with medications/Co-occur mental health and subst use/Co-occur mental health and medical/Insurance/financial Issues/Lack of social supports

## 2018-07-05 NOTE — DISCHARGE NOTE BEHAVIORAL HEALTH - NSBHDCADDFT_PSY_A_CORE
06-29 [id number] 5.2  06-30 Chol 126 LDL 46 HDL 53 Trig 136  QRS axis to [] ° and NSR at a rate of [] BPM. There was no atrial enlargement. There was no ventricular hypertrophy. There were no ST-T changes and all intervals were normal.

## 2018-07-05 NOTE — DISCHARGE NOTE BEHAVIORAL HEALTH - NSBHDCSUICFCTRSFT_PSY_A_CORE
1. depression -Pt is directed to continue with all medications until directed by his MD to change or discontinued with medications  2. use of alcohol and or substances Pt is directed to stop using substances and alcohol.    3 pt to use the support of the professional support system of the Psychiatrist and therapist. Pt alsio to use support of AA and NA meetings and to get a sponsor

## 2018-07-05 NOTE — DISCHARGE NOTE BEHAVIORAL HEALTH - PROVIDER TOKENS
FREE:[LAST:[[facility] -],FIRST:[[facility]],PHONE:[(   )    -],FAX:[(   )    -]]

## 2018-07-05 NOTE — DISCHARGE NOTE BEHAVIORAL HEALTH - NSBHDCSUICPROTECTFT_PSY_A_CORE
1. pt with help seeking behavior  2. Pt is aware of his symptoms when he is not doing well, when he had negative thoughts , when he was at risk for self injury and was able to show this by talk back method  3.pt is aware of the community self help groups such as AA and NA meeting  4.Pt claims " I am too afraid to cut my wrists or slice my throat or jump in front of traffic.  I wish I had a gun.  I would just blow my head off.  But I don't know where to buy a gun.  Where do I buy a gun?"

## 2018-07-05 NOTE — DISCHARGE NOTE BEHAVIORAL HEALTH - NSBHDCSUICFCTRMIT_PSY_A_CORE
1.Pt is directed to continue with all medications until directed by his MD to change or discontinued with medications  2. Pt is directed to stop using substances and alcohol.  Pt was also given information on the effects of substances on his thought process and behavior. Pt was also told of the dangers of the effects of substances in causing metabolic changes and can even lead to disability and death 1.Pt is directed to continue with all medications until directed by his MD to change or discontinued with medications  2. Pt is directed to stop using substances and alcohol.  Pt was also given information on the effects of substances on his thought process and behavior. Pt was also told of the dangers of the effects of substances in causing metabolic changes and can even lead to disability and death  3 pt is aware of the community support groups such as AA and NA meeting and is directed to get a sponsor

## 2018-07-05 NOTE — DISCHARGE NOTE BEHAVIORAL HEALTH - NSBHDCSUICFCTROTHERFT_PSY_A_CORE
1. pt should relapse to active use of alcohol or other substances 1. pt should relapse to active use of alcohol or other substances or misuse of the opiate pain medications

## 2018-07-05 NOTE — DISCHARGE NOTE BEHAVIORAL HEALTH - NSBHDCSUICSAFETYFT_PSY_A_CORE
1.Reviewed with the pt about her symptoms when she is not doing well and the pt is able to demonstrate  her understanding by using the talk back method. Also discussed with the pt about a safety plan should the symptoms reappear as to what he should do to safeguard himself from worsening of symptoms and increasing the risk for suicide  2. Pt is aware should the symptoms reappear to go to the ER for evaluation  3. in the interim may contact Dr Mcelroy at [facility] [phone]
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
## 2018-07-06 PROCEDURE — 99232 SBSQ HOSP IP/OBS MODERATE 35: CPT

## 2018-07-06 RX ADMIN — LORATADINE 10 MILLIGRAM(S): 10 TABLET ORAL at 09:41

## 2018-07-06 RX ADMIN — LURASIDONE HYDROCHLORIDE 60 MILLIGRAM(S): 40 TABLET ORAL at 09:41

## 2018-07-06 RX ADMIN — Medication 1 MILLIGRAM(S): at 09:41

## 2018-07-06 RX ADMIN — Medication 100 MILLIGRAM(S): at 09:44

## 2018-07-06 RX ADMIN — TRAMADOL HYDROCHLORIDE 50 MILLIGRAM(S): 50 TABLET ORAL at 23:00

## 2018-07-06 RX ADMIN — TRAMADOL HYDROCHLORIDE 50 MILLIGRAM(S): 50 TABLET ORAL at 16:22

## 2018-07-06 RX ADMIN — LITHIUM CARBONATE 300 MILLIGRAM(S): 300 TABLET, EXTENDED RELEASE ORAL at 21:03

## 2018-07-06 RX ADMIN — LITHIUM CARBONATE 300 MILLIGRAM(S): 300 TABLET, EXTENDED RELEASE ORAL at 09:41

## 2018-07-06 RX ADMIN — SENNA PLUS 2 TABLET(S): 8.6 TABLET ORAL at 21:04

## 2018-07-06 RX ADMIN — Medication 1 TABLET(S): at 09:41

## 2018-07-06 RX ADMIN — GABAPENTIN 300 MILLIGRAM(S): 400 CAPSULE ORAL at 09:41

## 2018-07-06 RX ADMIN — Medication 100 MILLIGRAM(S): at 09:41

## 2018-07-06 RX ADMIN — TRAMADOL HYDROCHLORIDE 50 MILLIGRAM(S): 50 TABLET ORAL at 22:25

## 2018-07-06 RX ADMIN — Medication 100 MILLIGRAM(S): at 21:03

## 2018-07-07 LAB
ANION GAP SERPL CALC-SCNC: 8 MMOL/L — SIGNIFICANT CHANGE UP (ref 5–17)
BUN SERPL-MCNC: 17 MG/DL — SIGNIFICANT CHANGE UP (ref 7–23)
CALCIUM SERPL-MCNC: 9.1 MG/DL — SIGNIFICANT CHANGE UP (ref 8.5–10.1)
CHLORIDE SERPL-SCNC: 108 MMOL/L — SIGNIFICANT CHANGE UP (ref 96–108)
CO2 SERPL-SCNC: 25 MMOL/L — SIGNIFICANT CHANGE UP (ref 22–31)
CREAT SERPL-MCNC: 1.04 MG/DL — SIGNIFICANT CHANGE UP (ref 0.5–1.3)
GLUCOSE SERPL-MCNC: 84 MG/DL — SIGNIFICANT CHANGE UP (ref 70–99)
LITHIUM SERPL-MCNC: 0.4 MMOL/L — LOW (ref 0.6–1.2)
POTASSIUM SERPL-MCNC: 4.4 MMOL/L — SIGNIFICANT CHANGE UP (ref 3.5–5.3)
POTASSIUM SERPL-SCNC: 4.4 MMOL/L — SIGNIFICANT CHANGE UP (ref 3.5–5.3)
SODIUM SERPL-SCNC: 141 MMOL/L — SIGNIFICANT CHANGE UP (ref 135–145)

## 2018-07-07 RX ADMIN — Medication 1 TABLET(S): at 09:35

## 2018-07-07 RX ADMIN — TRAMADOL HYDROCHLORIDE 50 MILLIGRAM(S): 50 TABLET ORAL at 23:15

## 2018-07-07 RX ADMIN — TRAMADOL HYDROCHLORIDE 50 MILLIGRAM(S): 50 TABLET ORAL at 17:02

## 2018-07-07 RX ADMIN — Medication 1 MILLIGRAM(S): at 09:34

## 2018-07-07 RX ADMIN — LITHIUM CARBONATE 300 MILLIGRAM(S): 300 TABLET, EXTENDED RELEASE ORAL at 21:16

## 2018-07-07 RX ADMIN — SENNA PLUS 2 TABLET(S): 8.6 TABLET ORAL at 21:16

## 2018-07-07 RX ADMIN — LORATADINE 10 MILLIGRAM(S): 10 TABLET ORAL at 09:35

## 2018-07-07 RX ADMIN — TRAMADOL HYDROCHLORIDE 50 MILLIGRAM(S): 50 TABLET ORAL at 08:30

## 2018-07-07 RX ADMIN — Medication 100 MILLIGRAM(S): at 09:34

## 2018-07-07 RX ADMIN — Medication 100 MILLIGRAM(S): at 09:35

## 2018-07-07 RX ADMIN — LURASIDONE HYDROCHLORIDE 60 MILLIGRAM(S): 40 TABLET ORAL at 09:35

## 2018-07-07 RX ADMIN — TRAMADOL HYDROCHLORIDE 50 MILLIGRAM(S): 50 TABLET ORAL at 07:30

## 2018-07-07 RX ADMIN — LITHIUM CARBONATE 300 MILLIGRAM(S): 300 TABLET, EXTENDED RELEASE ORAL at 09:35

## 2018-07-07 RX ADMIN — Medication 100 MILLIGRAM(S): at 21:16

## 2018-07-07 RX ADMIN — GABAPENTIN 300 MILLIGRAM(S): 400 CAPSULE ORAL at 09:34

## 2018-07-08 RX ADMIN — LITHIUM CARBONATE 300 MILLIGRAM(S): 300 TABLET, EXTENDED RELEASE ORAL at 20:51

## 2018-07-08 RX ADMIN — Medication 100 MILLIGRAM(S): at 20:51

## 2018-07-08 RX ADMIN — TRAMADOL HYDROCHLORIDE 50 MILLIGRAM(S): 50 TABLET ORAL at 17:09

## 2018-07-08 RX ADMIN — TRAMADOL HYDROCHLORIDE 50 MILLIGRAM(S): 50 TABLET ORAL at 09:17

## 2018-07-08 RX ADMIN — LURASIDONE HYDROCHLORIDE 60 MILLIGRAM(S): 40 TABLET ORAL at 09:04

## 2018-07-08 RX ADMIN — Medication 1 TABLET(S): at 09:04

## 2018-07-08 RX ADMIN — Medication 100 MILLIGRAM(S): at 09:04

## 2018-07-08 RX ADMIN — TRAMADOL HYDROCHLORIDE 50 MILLIGRAM(S): 50 TABLET ORAL at 10:15

## 2018-07-08 RX ADMIN — LORATADINE 10 MILLIGRAM(S): 10 TABLET ORAL at 09:04

## 2018-07-08 RX ADMIN — TRAMADOL HYDROCHLORIDE 50 MILLIGRAM(S): 50 TABLET ORAL at 00:01

## 2018-07-08 RX ADMIN — TRAMADOL HYDROCHLORIDE 50 MILLIGRAM(S): 50 TABLET ORAL at 19:00

## 2018-07-08 RX ADMIN — GABAPENTIN 300 MILLIGRAM(S): 400 CAPSULE ORAL at 09:04

## 2018-07-08 RX ADMIN — SENNA PLUS 2 TABLET(S): 8.6 TABLET ORAL at 20:51

## 2018-07-08 RX ADMIN — TRAMADOL HYDROCHLORIDE 50 MILLIGRAM(S): 50 TABLET ORAL at 23:15

## 2018-07-08 RX ADMIN — LITHIUM CARBONATE 300 MILLIGRAM(S): 300 TABLET, EXTENDED RELEASE ORAL at 09:04

## 2018-07-08 RX ADMIN — Medication 1 MILLIGRAM(S): at 09:04

## 2018-07-09 PROCEDURE — 99232 SBSQ HOSP IP/OBS MODERATE 35: CPT

## 2018-07-09 RX ORDER — TRAMADOL HYDROCHLORIDE 50 MG/1
50 TABLET ORAL ONCE
Qty: 0 | Refills: 0 | Status: DISCONTINUED | OUTPATIENT
Start: 2018-07-09 | End: 2018-07-09

## 2018-07-09 RX ORDER — TRAMADOL HYDROCHLORIDE 50 MG/1
50 TABLET ORAL EVERY 8 HOURS
Qty: 0 | Refills: 0 | Status: DISCONTINUED | OUTPATIENT
Start: 2018-07-09 | End: 2018-07-11

## 2018-07-09 RX ADMIN — Medication 1 TABLET(S): at 09:10

## 2018-07-09 RX ADMIN — LURASIDONE HYDROCHLORIDE 60 MILLIGRAM(S): 40 TABLET ORAL at 09:11

## 2018-07-09 RX ADMIN — LITHIUM CARBONATE 300 MILLIGRAM(S): 300 TABLET, EXTENDED RELEASE ORAL at 09:10

## 2018-07-09 RX ADMIN — TRAMADOL HYDROCHLORIDE 50 MILLIGRAM(S): 50 TABLET ORAL at 05:30

## 2018-07-09 RX ADMIN — Medication 100 MILLIGRAM(S): at 09:10

## 2018-07-09 RX ADMIN — GABAPENTIN 300 MILLIGRAM(S): 400 CAPSULE ORAL at 09:10

## 2018-07-09 RX ADMIN — SENNA PLUS 2 TABLET(S): 8.6 TABLET ORAL at 21:36

## 2018-07-09 RX ADMIN — Medication 100 MILLIGRAM(S): at 09:17

## 2018-07-09 RX ADMIN — LITHIUM CARBONATE 300 MILLIGRAM(S): 300 TABLET, EXTENDED RELEASE ORAL at 21:36

## 2018-07-09 RX ADMIN — Medication 1 MILLIGRAM(S): at 09:10

## 2018-07-09 RX ADMIN — TRAMADOL HYDROCHLORIDE 50 MILLIGRAM(S): 50 TABLET ORAL at 06:41

## 2018-07-09 RX ADMIN — TRAMADOL HYDROCHLORIDE 50 MILLIGRAM(S): 50 TABLET ORAL at 00:01

## 2018-07-09 RX ADMIN — MAGNESIUM HYDROXIDE 30 MILLILITER(S): 400 TABLET, CHEWABLE ORAL at 09:25

## 2018-07-09 RX ADMIN — TRAMADOL HYDROCHLORIDE 50 MILLIGRAM(S): 50 TABLET ORAL at 15:40

## 2018-07-09 RX ADMIN — LORATADINE 10 MILLIGRAM(S): 10 TABLET ORAL at 09:11

## 2018-07-09 RX ADMIN — Medication 100 MILLIGRAM(S): at 21:36

## 2018-07-09 NOTE — PHYSICAL THERAPY INITIAL EVALUATION ADULT - MANUAL MUSCLE TESTING RESULTS, REHAB EVAL
except L shld FE 3+/5/no strength deficits were identified
grossly assessed due to/back pain. 3+/5 to 4-/5 throughout

## 2018-07-09 NOTE — PHYSICAL THERAPY INITIAL EVALUATION ADULT - CRITERIA FOR SKILLED THERAPEUTIC INTERVENTIONS
impairments found/functional limitations in following categories
pt does not require further PT intervention @ this time; pt currently @ baseline functional status; recommend daily amb with RW as quyen on nursing floor care

## 2018-07-09 NOTE — PHYSICAL THERAPY INITIAL EVALUATION ADULT - MODALITIES TREATMENT COMMENTS
pt left seated @ b/s post Eval on [facility] @ pt request; callbell in reach; quyen well; pain persists; RN Ewa peguero
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
## 2018-07-09 NOTE — PHYSICAL THERAPY INITIAL EVALUATION ADULT - GAIT DEVIATIONS NOTED, PT EVAL
decreased stride length/shuffling gait/decreased idalia
decreased step length/decreased toe clearance/increased stride width

## 2018-07-10 PROCEDURE — 99232 SBSQ HOSP IP/OBS MODERATE 35: CPT

## 2018-07-10 RX ADMIN — TRAMADOL HYDROCHLORIDE 50 MILLIGRAM(S): 50 TABLET ORAL at 21:53

## 2018-07-10 RX ADMIN — Medication 100 MILLIGRAM(S): at 09:27

## 2018-07-10 RX ADMIN — SENNA PLUS 2 TABLET(S): 8.6 TABLET ORAL at 21:52

## 2018-07-10 RX ADMIN — GABAPENTIN 300 MILLIGRAM(S): 400 CAPSULE ORAL at 09:27

## 2018-07-10 RX ADMIN — LITHIUM CARBONATE 300 MILLIGRAM(S): 300 TABLET, EXTENDED RELEASE ORAL at 21:52

## 2018-07-10 RX ADMIN — Medication 100 MILLIGRAM(S): at 21:52

## 2018-07-10 RX ADMIN — Medication 1 TABLET(S): at 09:27

## 2018-07-10 RX ADMIN — LITHIUM CARBONATE 300 MILLIGRAM(S): 300 TABLET, EXTENDED RELEASE ORAL at 09:27

## 2018-07-10 RX ADMIN — LORATADINE 10 MILLIGRAM(S): 10 TABLET ORAL at 09:27

## 2018-07-10 RX ADMIN — Medication 1 MILLIGRAM(S): at 09:27

## 2018-07-10 RX ADMIN — TRAMADOL HYDROCHLORIDE 50 MILLIGRAM(S): 50 TABLET ORAL at 12:32

## 2018-07-10 RX ADMIN — LURASIDONE HYDROCHLORIDE 60 MILLIGRAM(S): 40 TABLET ORAL at 09:27

## 2018-07-10 RX ADMIN — TRAMADOL HYDROCHLORIDE 50 MILLIGRAM(S): 50 TABLET ORAL at 00:53

## 2018-07-11 VITALS
HEART RATE: 100 BPM | TEMPERATURE: 98 F | DIASTOLIC BLOOD PRESSURE: 97 MMHG | SYSTOLIC BLOOD PRESSURE: 136 MMHG | OXYGEN SATURATION: 100 % | RESPIRATION RATE: 14 BRPM

## 2018-07-11 DIAGNOSIS — F31.75 BIPOLAR DISORDER, IN PARTIAL REMISSION, MOST RECENT EPISODE DEPRESSED: ICD-10-CM

## 2018-07-11 RX ORDER — GABAPENTIN 400 MG/1
1 CAPSULE ORAL
Qty: 42 | Refills: 0 | OUTPATIENT
Start: 2018-07-11 | End: 2018-07-24

## 2018-07-11 RX ORDER — LIDOCAINE 4 G/100G
2 CREAM TOPICAL
Qty: 28 | Refills: 1 | OUTPATIENT
Start: 2018-07-11 | End: 2018-08-07

## 2018-07-11 RX ORDER — TRAMADOL HYDROCHLORIDE 50 MG/1
1 TABLET ORAL
Qty: 42 | Refills: 0 | OUTPATIENT
Start: 2018-07-11 | End: 2018-07-24

## 2018-07-11 RX ORDER — LURASIDONE HYDROCHLORIDE 40 MG/1
1 TABLET ORAL
Qty: 14 | Refills: 1 | OUTPATIENT
Start: 2018-07-11 | End: 2018-08-07

## 2018-07-11 RX ORDER — LITHIUM CARBONATE 300 MG/1
1 TABLET, EXTENDED RELEASE ORAL
Qty: 28 | Refills: 1 | OUTPATIENT
Start: 2018-07-11 | End: 2018-08-07

## 2018-07-11 RX ORDER — TRAMADOL HYDROCHLORIDE 50 MG/1
1 TABLET ORAL
Qty: 0 | Refills: 0 | COMMUNITY

## 2018-07-11 RX ADMIN — Medication 100 MILLIGRAM(S): at 08:47

## 2018-07-11 RX ADMIN — Medication 1 MILLIGRAM(S): at 08:47

## 2018-07-11 RX ADMIN — LITHIUM CARBONATE 300 MILLIGRAM(S): 300 TABLET, EXTENDED RELEASE ORAL at 08:47

## 2018-07-11 RX ADMIN — LORATADINE 10 MILLIGRAM(S): 10 TABLET ORAL at 08:47

## 2018-07-11 RX ADMIN — Medication 1 TABLET(S): at 08:47

## 2018-07-11 RX ADMIN — TRAMADOL HYDROCHLORIDE 50 MILLIGRAM(S): 50 TABLET ORAL at 08:47

## 2018-07-11 RX ADMIN — LURASIDONE HYDROCHLORIDE 60 MILLIGRAM(S): 40 TABLET ORAL at 08:47

## 2018-07-11 RX ADMIN — GABAPENTIN 300 MILLIGRAM(S): 400 CAPSULE ORAL at 08:47

## 2018-07-11 NOTE — PROGRESS NOTE BEHAVIORAL HEALTH - PROBLEM SELECTOR PROBLEM 3
Noncompliance
Suicidal ideation
Noncompliance
Noncompliance
Suicidal ideation
Noncompliance
Suicidal ideation

## 2018-07-11 NOTE — PROGRESS NOTE BEHAVIORAL HEALTH - NSBHCHARTREVIEWINVESTIGATE_PSY_A_CORE FT
qtc 449    QRS axis to [] ° and NSR at a rate of [] BPM. There was no atrial enlargement. There was no ventricular hypertrophy. There were no ST-T changes and all intervals were normal.
QRS axis to [] ° and NSR at a rate of [] BPM. There was no atrial enlargement. There was no ventricular hypertrophy. There were no ST-T changes and all intervals were normal.
qtc 449    QRS axis to [] ° and NSR at a rate of [] BPM. There was no atrial enlargement. There was no ventricular hypertrophy. There were no ST-T changes and all intervals were normal.
QRS axis to [] ° and NSR at a rate of [] BPM. There was no atrial enlargement. There was no ventricular hypertrophy. There were no ST-T changes and all intervals were normal.
qtc 449    QRS axis to [] ° and NSR at a rate of [] BPM. There was no atrial enlargement. There was no ventricular hypertrophy. There were no ST-T changes and all intervals were normal.
qtc 449
QRS axis to [] ° and NSR at a rate of [] BPM. There was no atrial enlargement. There was no ventricular hypertrophy. There were no ST-T changes and all intervals were normal.
qtc 449    QRS axis to [] ° and NSR at a rate of [] BPM. There was no atrial enlargement. There was no ventricular hypertrophy. There were no ST-T changes and all intervals were normal.
QRS axis to [] ° and NSR at a rate of [] BPM. There was no atrial enlargement. There was no ventricular hypertrophy. There were no ST-T changes and all intervals were normal.

## 2018-07-11 NOTE — PROGRESS NOTE BEHAVIORAL HEALTH - NSBHADMITIPOBSFT_PSY_A_CORE
pt with initially depression and suicide thoughts
denies SI intent, contracted for safety
pt with initially depression and suicide thoughts
denies SI intent, contracted for safety
denies SI intent, contracted for safety
pt with initially depression and suicide thoughts
pt with initially depression and suicide thoughts
denies SI intent, contracted for safety
denies SI intent, contracted for safety

## 2018-07-11 NOTE — PROGRESS NOTE BEHAVIORAL HEALTH - NSBHATTESTSEENBY_PSY_A_CORE
attending Psychiatrist without NP/Trainee
NP with telephonic supervision from Attending Psychiatrist
attending Psychiatrist without NP/Trainee
NP with telephonic supervision from Attending Psychiatrist

## 2018-07-11 NOTE — PROGRESS NOTE BEHAVIORAL HEALTH - SUMMARY
57 yo man with long hx of depression and alcohol use presents with increasing depression and SI in context of potential loss of home, relapse onto substances
FORMULATION:   Summary: 59 yo man with long hx of depression, alcohol use, family hx of alcohol use, who is now facing homelessness and lack of support.  Pt with increased sense of loneliness and increased thoughts of suicide but for the fear of disability or that the attempt would fail .  Pt with intense lability of mood and affect , dysphoria and periods of amotivation and inability to care for himself.     tolerating meds, no evidence of withdrawal, diaphoretic, exertional.  Continue observe, support, collaterals with [facility], discharge planning
59 yo man with long hx of depression and alcohol use presents with increasing depression and SI in context of potential loss of home, relapse onto substances use and feeling overwhelmed.
57 yo man with long hx of depression and alcohol use presents with increasing depression and SI in context od potential loss of home, relapse onto susbstances
FORMULATION:   Summary: 57 yo man with long hx of depression, alcohol use, family hx of alcohol use, who is now facing homelessness and lack of support.  Pt with increased sense of loneliness and increased thoughts of suicide but for the fear of disability or that the attempt would fail .  Pt with intense lability of mood and affect , dysphoria and periods of amotivation and inability to care for himself.     tolerating meds, no evidence of withdrawal, diaphoretic, exertional.  Continue observe, support, collaterals with [facility], discharge planning
59 yo man with long hx of depression and alcohol use presents with increasing depression and SI in context od potential loss of home, relapse onto susbstances
FORMULATION:   Summary: 57 yo man with long hx of depression, alcohol use, family hx of alcohol use, who is now facing homelessness and lack of support.  Pt with increased sense of loneliness and increased thoughts of suicide but for the fear of disability or that the attempt would fail .  Pt with intense lability of mood and affect , dysphoria and periods of amotivation and inability to care for himself.     tolerating meds, no evidence of withdrawal, diaphoretic, exertional.  Continue observe, support, collaterals with [facility], discharge planning
FORMULATION:   Summary: 59 yo man with long hx of depression, alcohol use, family hx of alcohol use, who is now facing homelessness and lack of support.  Pt with increased sense of loneliness and increased thoughts of suicide but for the fear of disability or that the attempt would fail .  Pt with intense lability of mood and affect , dysphoria and periods of amotivation and inability to care for himself.     tolerating meds, no evidence of withdrawal, diaphoretic, exertional.  Continue observe, support, collaterals with [facility], discharge planning
FORMULATION:   Summary: 59 yo man with long hx of depression, alcohol use, family hx of alcohol use, who is now facing homelessness and lack of support.  Pt with increased sense of loneliness and increased thoughts of suicide but for the fear of disability or that the attempt would fail .  Pt with intense lability of mood and affect , dysphoria and periods of amotivation and inability to care for himself.     tolerating meds, no evidence of withdrawal, diaphoretic, exertional.  Continue observe, support, collaterals with [facility], discharge planning

## 2018-07-11 NOTE — PROGRESS NOTE BEHAVIORAL HEALTH - ESTIMATED DISCHARGE DATE
07-Jul-2018
10-Jul-2018
10-Jul-2018
07-Jul-2018
07-Jul-2018
08-Jul-2018
08-Jul-2018
07-Jul-2018
07-Jul-2018

## 2018-07-11 NOTE — PROGRESS NOTE BEHAVIORAL HEALTH - PROBLEM SELECTOR PLAN 1
latuda   lithium augment
1. pt was on CIWA-A for the detox off of alcohol  now completed
latuda   lithium augment
latuda   lithium augment
1. pt was on CIWA-A for the detox off of alcohol  now completed
1. pt was on ciwa for the detox off of alcohol  now completed
latuda   lithium augment

## 2018-07-11 NOTE — PROGRESS NOTE BEHAVIORAL HEALTH - SECONDARY DX1
Alcohol use disorder, moderate, dependence

## 2018-07-11 NOTE — PROGRESS NOTE BEHAVIORAL HEALTH - NSBHADMITDANGERSELF_PSY_A_CORE
suicidal ideation with plan and means

## 2018-07-11 NOTE — PROGRESS NOTE BEHAVIORAL HEALTH - NSBHFUPINTERVALCCFT_PSY_A_CORE
"I'm less depressed "
"I'm still feeling down but not as bad"
"I'm feeling like I really should get discharged soon"
"I'm getting depressed and getting congested too."
"I am OK now because we are talking.  The SI thoughts come and go, right now I'm OK.  I slept well for the first time last night".
'I'm worried about where I can go after this"
"I'm looking forward to getting out of here to deal with the pain problem"
"I'm really getting constipated and I want to go home."
"I'm ready to go home, not depressed any more"

## 2018-07-11 NOTE — PROGRESS NOTE BEHAVIORAL HEALTH - OTHER
difficulty walking back pain
difficulty walking
difficulty walking back pain
difficulty walking back pain
difficulty walking
difficulty walking back pain
difficulty walking

## 2018-07-11 NOTE — PROGRESS NOTE BEHAVIORAL HEALTH - PROBLEM SELECTOR PROBLEM 1
Major depression, recurrent, chronic
Alcohol use disorder, moderate, dependence
Major depression, recurrent, chronic
Major depression, recurrent, chronic
Alcohol use disorder, moderate, dependence
Alcohol use disorder, moderate, dependence
Major depression, recurrent, chronic

## 2018-07-11 NOTE — PROGRESS NOTE BEHAVIORAL HEALTH - PROBLEM SELECTOR PLAN 2
group therapy
1. pt on the Latuda for the depression and the lithium augmentation
group therapy
group therapy
1. pt on the Latuda for the depression and the lithium augmentation
1. pt on the latuda for the depression and the lithium augmentation
group therapy

## 2018-07-11 NOTE — PROGRESS NOTE BEHAVIORAL HEALTH - NSBHFUPINTERVALHXFT_PSY_A_CORE
Pt with less depressive mood.  Pt still with worry concerning his living situation .  Pt with denial of any side effects from medication     1. Pt denies any suicidal ideation or plan   2. Pt with concerns of where he would be living .  Pt however needing rehab treatment for the alcohol   3. Protective factors  as the pt with help seeking behavior.  Pt with willing to seek help as he is not wanting to die. and is not feeling so hopeless at this time.   4. pt with minimal risk of self injury while on the unit. , pt on medication for mood and affect stability   5spoke with the pt about rehab treatment but pt claiming that he is not sure about that
Pt with more of the focus on his physical state and his physical pain.  Claims that he is not as depressed or anxious. He is denying any suicidal ideation plan. Pt denies any hallucination He denies any side effect from medication    Pt has a 72 hour letter in at this time.
Pt reported that he was able to sleep/  Pt with no distress and was not with any tremors.  Pt with cc of feeling head congestion   Pt with denial of any suicidal ideation ior plan Pt with need for decongestion. Pt with depression and qwill start the lithiun and will continue with the latuda 60mg  Pt denies any hallucinations    Will continue with the CIWA as this is day 3-4 day
Pt interviewed in room, diaphoretic,  no tremors VSS.  Pt denies current feeling of impending withdrawal, feels diaphoresis is due to "muscle tightening" and physical exertion following AA meeting.  Pt pleasant and cooperative.  Feels comforted by interview, c/o chronic loneliness and lack of socialization.  Has been in tx at [facility] for one month.  Adjusting to unit and milieu.  C/o chronic neck stiffness and waiting for PT eval and requested to lie down for interview.  Slept well, denies active SIIP as reports is intermittent, no past SA.
Pt claims less depressed in mood but claims "the pain in my back is now more my problem." Pt claims that he would like to be reassessed for PT  He denies any suicidal ideation or plan .  Pt claims he is too busy for drug rehab but would be interested in physical rehab "with the help of the tramadol"
Pt denies any side effect from medication  Awaiting the lithium level  Thyroid function with normal T3 and T4  .  Pt with no increase in depression Pt with need forrehab to prevent relapse to alcohol use.  Pt with
Pt denies any suicidal ideation or plan Pt with focus on his physical aches and pains. Pt denies any hallucinrion Py with dnial of any side effects from medications      Pt with more of the focus on his physical state and his physical pain.  Claims that he is not as depressed or anxious. He is denying any suicidal ideation plan. Pt denies any hallucination He denies any side effect from medication    Pt has a 72 hour letter in at this time.
Pt with improved mood and affect Pt with denial of any side effect .  Currently pt denies any suicidal ideation or plan Pt with  need for the lithium level to be gotten and this will need to be obtained on the 7/7 along with repeat of the bmp .  Pt with decreased depression and remain on the latuda for the depressive symptoms.  Pt with need for symptom awareness. Pt is aware of his symptoms when he is not doing well, when he had negative thoughts, feeling dysphoric , when he was at risk for self injury and excessive use of alcohol and he was able to show this by talk back method
Pt with more focus on his plans to get his pain medicationrefusing rehab treatment.s readjusted with his pain MD . Pt with denial of any suicidal ideation or plan . He is

## 2018-07-11 NOTE — PROGRESS NOTE BEHAVIORAL HEALTH - BODY HABITUS
Average build

## 2018-07-11 NOTE — PROGRESS NOTE BEHAVIORAL HEALTH - NSBHPTASSESSDT_PSY_A_CORE
01-Jul-2018 17:38
02-Jul-2018 19:27
03-Jul-2018 15:35
06-Jul-2018 18:56
11-Jul-2018 09:26
09-Jul-2018 22:10
04-Jul-2018 15:57
10-Jul-2018 20:30
05-Jul-2018 18:21

## 2018-07-11 NOTE — PROGRESS NOTE BEHAVIORAL HEALTH - AFFECT QUALITY
Anxious
Euthymic
Anxious
Euthymic
Depressed/Anxious
Euthymic
Depressed/Anxious
Depressed/Anxious
Euthymic

## 2018-07-11 NOTE — PROGRESS NOTE BEHAVIORAL HEALTH - PROBLEM SELECTOR PLAN 3
group therapy
1. pt denies any suicidal ideation or plan
group therapy
group therapy
1. pt denies any suicidal ideation or plan
1. pt denies any suicidal ideation or plan
group therapy

## 2018-07-11 NOTE — PROGRESS NOTE BEHAVIORAL HEALTH - NSBHCHARTREVIEWVS_PSY_A_CORE FT
Vital Signs Last 24 Hrs  T(C): 36.5 (11 Jul 2018 08:45), Max: 36.5 (11 Jul 2018 08:45)  T(F): 97.7 (11 Jul 2018 08:45), Max: 97.7 (11 Jul 2018 08:45)  HR: 100 (11 Jul 2018 08:45) (100 - 100)  BP: 136/97 (11 Jul 2018 08:45) (136/97 - 136/97)  BP(mean): --  RR: 14 (11 Jul 2018 08:45) (14 - 14)  SpO2: 100% (11 Jul 2018 08:45) (100% - 100%)
Vital Signs Last 24 Hrs  T(C): 37.3 (02 Jul 2018 17:55), Max: 37.3 (02 Jul 2018 17:55)  T(F): 99.1 (02 Jul 2018 17:55), Max: 99.1 (02 Jul 2018 17:55)  HR: 56 (02 Jul 2018 17:55) (56 - 78)  BP: 147/96 (02 Jul 2018 17:55) (124/86 - 147/96)  BP(mean): --  RR: 14 (02 Jul 2018 12:59) (14 - 14)  SpO2: 99% (02 Jul 2018 12:59) (98% - 99%)
Vital Signs Last 24 Hrs  T(C): 36.4 (09 Jul 2018 07:51), Max: 36.4 (09 Jul 2018 07:51)  T(F): 97.5 (09 Jul 2018 07:51), Max: 97.5 (09 Jul 2018 07:51)  HR: 52 (09 Jul 2018 07:51) (52 - 52)  BP: 141/89 (09 Jul 2018 07:51) (141/89 - 141/89)  BP(mean): --  RR: 14 (09 Jul 2018 07:51) (14 - 14)  SpO2: 99% (09 Jul 2018 07:51) (99% - 99%)
Vital Signs Last 24 Hrs  T(C): 37.3 (02 Jul 2018 17:55), Max: 37.3 (02 Jul 2018 17:55)  T(F): 99.1 (02 Jul 2018 17:55), Max: 99.1 (02 Jul 2018 17:55)  HR: 56 (02 Jul 2018 17:55) (56 - 78)  BP: 147/96 (02 Jul 2018 17:55) (124/86 - 147/96)  BP(mean): --  RR: 14 (02 Jul 2018 12:59) (14 - 14)  SpO2: 99% (02 Jul 2018 12:59) (98% - 99%)
ICU Vital Signs Last 24 Hrs  T(C): 36.3 (01 Jul 2018 07:29), Max: 36.3 (01 Jul 2018 07:29)  T(F): 97.3 (01 Jul 2018 07:29), Max: 97.3 (01 Jul 2018 07:29)  HR: 51 (01 Jul 2018 17:23) (51 - 95)  BP: 125/87 (01 Jul 2018 17:23) (125/87 - 136/80)  BP(mean): --  ABP: --  ABP(mean): --  RR: 13 (01 Jul 2018 17:23) (13 - 14)  SpO2: 100% (01 Jul 2018 17:23) (98% - 100%)
Vital Signs Last 24 Hrs  T(C): 36.7 (06 Jul 2018 08:52), Max: 36.7 (06 Jul 2018 08:52)  T(F): 98.1 (06 Jul 2018 08:52), Max: 98.1 (06 Jul 2018 08:52)  HR: 105 (06 Jul 2018 08:52) (105 - 105)  BP: 145/90 (06 Jul 2018 08:52) (145/90 - 145/90)  BP(mean): --  RR: 13 (06 Jul 2018 08:52) (13 - 13)  SpO2: 97% (06 Jul 2018 08:52) (97% - 97%)
Vital Signs Last 24 Hrs  T(C): 35.6 (04 Jul 2018 08:45), Max: 35.6 (04 Jul 2018 08:45)  T(F): 96.1 (04 Jul 2018 08:45), Max: 96.1 (04 Jul 2018 08:45)  HR: 56 (04 Jul 2018 08:45) (56 - 56)  BP: 136/92 (04 Jul 2018 08:45) (136/92 - 136/92)  BP(mean): --  RR: 16 (04 Jul 2018 08:45) (16 - 16)  SpO2: 98% (04 Jul 2018 08:45) (98% - 98%)
Vital Signs Last 24 Hrs  T(C): 36.3 (10 Jul 2018 07:46), Max: 36.3 (10 Jul 2018 07:46)  T(F): 97.4 (10 Jul 2018 07:46), Max: 97.4 (10 Jul 2018 07:46)  HR: 56 (10 Jul 2018 07:46) (56 - 56)  BP: 143/88 (10 Jul 2018 07:46) (143/88 - 143/88)  BP(mean): --  RR: 14 (10 Jul 2018 07:46) (14 - 14)  SpO2: 98% (10 Jul 2018 07:46) (98% - 98%)
Vital Signs Last 24 Hrs  T(C): 36.5 (05 Jul 2018 08:44), Max: 36.5 (05 Jul 2018 08:44)  T(F): 97.7 (05 Jul 2018 08:44), Max: 97.7 (05 Jul 2018 08:44)  HR: 92 (05 Jul 2018 08:44) (92 - 92)  BP: 121/87 (05 Jul 2018 08:44) (121/87 - 121/87)  BP(mean): --  RR: 14 (05 Jul 2018 08:44) (14 - 14)  SpO2: 99% (05 Jul 2018 08:44) (99% - 99%)

## 2018-07-11 NOTE — PROGRESS NOTE BEHAVIORAL HEALTH - NSBHADMITIPREASON_PSY_A_CORE
had suicide thought on admissions/Danger to self; mental illness expected to respond to inpatient care
Danger to self; mental illness expected to respond to inpatient care
had suicide thought on admissions/Danger to self; mental illness expected to respond to inpatient care
had suicidea thought on admissions/Danger to self; mental illness expected to respond to inpatient care
Danger to self; mental illness expected to respond to inpatient care
Danger to self; mental illness expected to respond to inpatient care
had suicidea thought on admissions/Danger to self; mental illness expected to respond to inpatient care
Danger to self; mental illness expected to respond to inpatient care
Danger to self; mental illness expected to respond to inpatient care

## 2018-07-11 NOTE — PROGRESS NOTE BEHAVIORAL HEALTH - DETAILS
back pain  Problems walking

## 2018-07-11 NOTE — PROGRESS NOTE BEHAVIORAL HEALTH - NSBHCHARTREVIEWLAB_PSY_A_CORE FT
06-29 QuordfprceD9L 5.2  06-30 Chol 126 LDL 46 HDL 53 Trig 136
TSH 41.6
06-29 PedswgfjesS3C 5.2  06-30 Chol 126 LDL 46 HDL 53 Trig 136
TSH 41.6
TSH 41.6
TSH 41.6  T4, Serum: 8.7 ug/dL (07.01.18 @ 07:00)  T3 151
06-29 NnjadhabkyY6U 5.2  06-30 Chol 126 LDL 46 HDL 53 Trig 136
TSH 41.6  T4, Serum: 8.7 ug/dL (07.01.18 @ 07:00)  T3 151

## 2018-07-11 NOTE — PROGRESS NOTE BEHAVIORAL HEALTH - RISK ASSESSMENT
moderate    depressed mood ,lack of supports, impending homelessness
· Risk Assessment	moderate    depressed mood ,lack of supports, impending homelessness
moderate    depressed mood ,lack of supports, impending homelessness
moderate    depressed mood ,lack of supports, impending homelessness
· Risk Assessment	moderate    modifiable is depressed mood , use of alcohol , noncompliance of medication  static factors: ,lack of supports, impending homelessness
moderate    depressed mood ,lack of supports, impending homelessness
· Risk Assessment	moderate    depressed mood ,lack of supports, impending homelessness

## 2018-07-11 NOTE — PROGRESS NOTE BEHAVIORAL HEALTH - MOOD
Anxious
Normal/Anxious
Anxious
Normal/Anxious
Anxious/Depressed
Normal/Anxious
Anxious/Depressed
Depressed/Anxious
Anxious/Normal

## 2018-07-11 NOTE — PROGRESS NOTE BEHAVIORAL HEALTH - PROBLEM SELECTOR PROBLEM 2
Alcohol use disorder, moderate, dependence
Major depression, recurrent, chronic
Alcohol use disorder, moderate, dependence
Alcohol use disorder, moderate, dependence
Major depression, recurrent, chronic
Alcohol use disorder, moderate, dependence
Major depression, recurrent, chronic

## 2018-07-13 NOTE — CHART NOTE - NSCHARTNOTEFT_GEN_A_CORE
Patient not reachable by phone and several voice messages have been left at various times of the day.  Letter to be sent.
Left voice message for patient.

## 2018-07-16 DIAGNOSIS — R45.851 SUICIDAL IDEATIONS: ICD-10-CM

## 2018-07-16 DIAGNOSIS — G89.29 OTHER CHRONIC PAIN: ICD-10-CM

## 2018-07-16 DIAGNOSIS — F32.9 MAJOR DEPRESSIVE DISORDER, SINGLE EPISODE, UNSPECIFIED: ICD-10-CM

## 2018-07-16 DIAGNOSIS — I10 ESSENTIAL (PRIMARY) HYPERTENSION: ICD-10-CM

## 2018-07-16 DIAGNOSIS — F10.20 ALCOHOL DEPENDENCE, UNCOMPLICATED: ICD-10-CM

## 2018-07-16 DIAGNOSIS — Z59.0 HOMELESSNESS: ICD-10-CM

## 2018-07-16 DIAGNOSIS — Z91.19 PATIENT'S NONCOMPLIANCE WITH OTHER MEDICAL TREATMENT AND REGIMEN: ICD-10-CM
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
## 2018-07-16 DIAGNOSIS — F33.9 MAJOR DEPRESSIVE DISORDER, RECURRENT, UNSPECIFIED: ICD-10-CM

## 2018-07-16 SDOH — ECONOMIC STABILITY - HOUSING INSECURITY: HOMELESSNESS: Z59.0

## 2018-07-23 PROBLEM — Z80.8 FAMILY HISTORY OF SKIN CANCER: Status: ACTIVE | Noted: 2017-03-21

## 2018-08-03 NOTE — PROGRESS NOTE BEHAVIORAL HEALTH - THOUGHT CONTENT
Unremarkable
Prophylactic measure
Blurred vision, right eye
Unremarkable
Suicidality
Unremarkable
Suicidality
Suicidality
Unremarkable

## 2018-12-04 NOTE — PROGRESS NOTE BEHAVIORAL HEALTH - NS ED BHA MED ROS HEMATOLOGIC LYMPHATIC
vital signs
No complaints

## 2019-07-12 ENCOUNTER — APPOINTMENT (OUTPATIENT)
Dept: DERMATOLOGY | Facility: CLINIC | Age: 59
End: 2019-07-12

## 2019-12-09 NOTE — PATIENT PROFILE BEHAVIORAL HEALTH - NSBHHALLU__PSY_A_CORE
-Likely hepatic congestion due to acute CHF.   -Continue Diuresis   -Improving with diuresis    denies

## 2021-04-30 NOTE — DISCHARGE NOTE BEHAVIORAL HEALTH - NSBHDCPLANTRANSMITDT_PSY_A_CORE
MARISSA (acute kidney injury) ([facility] [address] )  Barthel Index Score [address]   Licensure   [place] License Number  Kimo Maher PT [id number]     Kurt Lombardo PT 11-Jul-2018 17:00

## 2021-12-14 NOTE — BEHAVIORAL HEALTH ASSESSMENT NOTE - AFFECT CONGRUENCE
Gabapentin 300mg #90 LF 10/25/21 was originally prescribed by Ara Lin per [facility]   Congruent

## 2022-01-01 NOTE — PATIENT PROFILE ADULT. - TEACHING/LEARNING LEARNING PREFERENCES
verbal instruction/group instruction/individual instruction/audio/video/pictorial/skill demonstration/computer/internet/written material Ana María Yanes  (RN)  2022 15:00:40

## 2022-02-14 NOTE — ED BEHAVIORAL HEALTH ASSESSMENT NOTE - NS ED BHA MED ROS GENITOURINARY
[facility]  [address] [place] [address]  [facility] [address]  Phone: [phone]  Fax: [phone]    19 Cecilia Pacheco MD    February 14, 2022     Ariel Harrell MD  [facility] [id number]    Patient: Kehinde Adair   MR Number: <[id number]>   YOB: 1945   Date of Visit: 2/14/2022       Dear Ariel Harrell:    Thank you for referring Pete Schultz to me for evaluation/treatment. Below are the relevant portions of my assessment and plan of care. ASSESSMENT/PLAN:  1. Left inguinal hernia      Repair    The risks, benefits and alternatives to the planned procedure were discussed. Patient expressed an understanding and is willing to proceed. If you have questions, please do not hesitate to call me. I look forward to following Shannon Osullivan along with you.     Sincerely,      Augustine Pacheco MD No complaints

## 2024-10-30 NOTE — BEHAVIORAL HEALTH ASSESSMENT NOTE - NSBHADMITIPDSM5_PSY_A_CORE FT
BEH IP Unit Acuity Rating Score (UARS)  Patient is given one point for every criteria they meet.    CRITERIA SCORING   On a 72 hour hold, court hold, committed, stay of commitment, or revocation. 0    Patient LOS on BEH unit exceeds 20 days. 0  LOS: 6   Patient under guardianship, 55+, otherwise medically complex, or under age 11. 0   Suicide ideation without relief of precipitating factors. 0   Current plan for suicide. 0   Current plan for homicide. 0   Imminent risk or actual attempt to seriously harm another without relief of factors precipitating the attempt. 0   Severe dysfunction in daily living (ex: complete neglect for self care, extreme disruption in vegetative function, extreme deterioration in social interactions). 0   Recent (last 7 days) or current physical aggression in the ED or on unit. 0   Restraints or seclusion episode in past 72 hours. 0   Recent (last 7 days) or current verbal aggression, agitation, yelling, etc., while in the ED or unit. 0   Active psychosis. 0   Need for constant or near constant redirection (from leaving, from others, etc).  0   Intrusive or disruptive behaviors. 0   Patient requires 3 or more hours of individualized nursing care per 8-hour shift (i.e. for ADLs, meds, therapeutic interventions). 0   TOTAL 0                              38

## 2025-06-18 NOTE — DISCHARGE NOTE BEHAVIORAL HEALTH - NSBHDCTESTSPEND_PSY_A_CORE
none